# Patient Record
Sex: FEMALE | Race: WHITE | HISPANIC OR LATINO | URBAN - METROPOLITAN AREA
[De-identification: names, ages, dates, MRNs, and addresses within clinical notes are randomized per-mention and may not be internally consistent; named-entity substitution may affect disease eponyms.]

---

## 2020-03-12 ENCOUNTER — OFFICE VISIT (OUTPATIENT)
Dept: SURGERY | Age: 60
End: 2020-03-12

## 2020-03-12 DIAGNOSIS — Z12.11 ENCOUNTER FOR SCREENING COLONOSCOPY: Primary | ICD-10-CM

## 2020-03-12 PROCEDURE — 99213 OFFICE O/P EST LOW 20 MIN: CPT | Performed by: COLON & RECTAL SURGERY

## 2020-03-13 ENCOUNTER — TELEPHONE (OUTPATIENT)
Dept: SURGERY | Age: 60
End: 2020-03-13

## 2020-03-13 PROBLEM — Z12.11 ENCOUNTER FOR SCREENING COLONOSCOPY: Status: ACTIVE | Noted: 2020-03-13

## 2020-03-23 ENCOUNTER — TELEPHONE (OUTPATIENT)
Dept: SURGERY | Age: 60
End: 2020-03-23

## 2020-05-20 ENCOUNTER — TELEPHONE (OUTPATIENT)
Dept: SURGERY | Age: 60
End: 2020-05-20

## 2020-10-08 ENCOUNTER — LAB ENCOUNTER (OUTPATIENT)
Dept: LAB | Age: 60
End: 2020-10-08
Attending: INTERNAL MEDICINE
Payer: COMMERCIAL

## 2020-10-08 ENCOUNTER — OFFICE VISIT (OUTPATIENT)
Dept: INTERNAL MEDICINE CLINIC | Facility: CLINIC | Age: 60
End: 2020-10-08
Payer: COMMERCIAL

## 2020-10-08 VITALS
OXYGEN SATURATION: 98 % | DIASTOLIC BLOOD PRESSURE: 72 MMHG | SYSTOLIC BLOOD PRESSURE: 124 MMHG | HEART RATE: 87 BPM | WEIGHT: 209 LBS | TEMPERATURE: 97 F | BODY MASS INDEX: 35.68 KG/M2 | HEIGHT: 64.2 IN

## 2020-10-08 DIAGNOSIS — Z00.00 PHYSICAL EXAM: ICD-10-CM

## 2020-10-08 DIAGNOSIS — E11.9 TYPE 2 DIABETES MELLITUS WITHOUT COMPLICATION, WITHOUT LONG-TERM CURRENT USE OF INSULIN (HCC): ICD-10-CM

## 2020-10-08 DIAGNOSIS — I10 ESSENTIAL HYPERTENSION: ICD-10-CM

## 2020-10-08 DIAGNOSIS — E03.9 HYPOTHYROIDISM, UNSPECIFIED TYPE: ICD-10-CM

## 2020-10-08 DIAGNOSIS — E78.5 HYPERLIPIDEMIA, UNSPECIFIED HYPERLIPIDEMIA TYPE: ICD-10-CM

## 2020-10-08 DIAGNOSIS — Z00.00 PHYSICAL EXAM: Primary | ICD-10-CM

## 2020-10-08 PROCEDURE — 82043 UR ALBUMIN QUANTITATIVE: CPT

## 2020-10-08 PROCEDURE — 83036 HEMOGLOBIN GLYCOSYLATED A1C: CPT

## 2020-10-08 PROCEDURE — 80061 LIPID PANEL: CPT

## 2020-10-08 PROCEDURE — 85025 COMPLETE CBC W/AUTO DIFF WBC: CPT

## 2020-10-08 PROCEDURE — 84439 ASSAY OF FREE THYROXINE: CPT

## 2020-10-08 PROCEDURE — 99386 PREV VISIT NEW AGE 40-64: CPT | Performed by: INTERNAL MEDICINE

## 2020-10-08 PROCEDURE — 3078F DIAST BP <80 MM HG: CPT | Performed by: INTERNAL MEDICINE

## 2020-10-08 PROCEDURE — 36415 COLL VENOUS BLD VENIPUNCTURE: CPT

## 2020-10-08 PROCEDURE — 84443 ASSAY THYROID STIM HORMONE: CPT

## 2020-10-08 PROCEDURE — 3008F BODY MASS INDEX DOCD: CPT | Performed by: INTERNAL MEDICINE

## 2020-10-08 PROCEDURE — 82570 ASSAY OF URINE CREATININE: CPT

## 2020-10-08 PROCEDURE — 3074F SYST BP LT 130 MM HG: CPT | Performed by: INTERNAL MEDICINE

## 2020-10-08 PROCEDURE — 80053 COMPREHEN METABOLIC PANEL: CPT

## 2020-10-08 RX ORDER — HYDROCHLOROTHIAZIDE 25 MG/1
TABLET ORAL
COMMUNITY
Start: 2020-09-23 | End: 2021-01-14

## 2020-10-08 RX ORDER — ROSUVASTATIN CALCIUM 5 MG/1
TABLET, COATED ORAL
COMMUNITY
Start: 2018-01-20 | End: 2020-10-08

## 2020-10-08 RX ORDER — LOSARTAN POTASSIUM 50 MG/1
50 TABLET ORAL DAILY
Qty: 90 TABLET | Refills: 3 | Status: SHIPPED | OUTPATIENT
Start: 2020-10-08 | End: 2021-04-20

## 2020-10-08 RX ORDER — SITAGLIPTIN AND METFORMIN HYDROCHLORIDE 50; 1000 MG/1; MG/1
TABLET, FILM COATED ORAL
COMMUNITY
Start: 2020-09-28 | End: 2020-10-09

## 2020-10-08 RX ORDER — AMLODIPINE BESYLATE 5 MG/1
TABLET ORAL
COMMUNITY
Start: 2020-09-23 | End: 2020-10-08

## 2020-10-08 RX ORDER — DAPAGLIFLOZIN 10 MG/1
10 TABLET, FILM COATED ORAL EVERY MORNING
COMMUNITY
Start: 2020-07-23 | End: 2021-06-25 | Stop reason: ALTCHOICE

## 2020-10-08 RX ORDER — MULTIVIT WITH CALCIUM,IRON,MIN 27MG-0.4MG
TABLET ORAL
COMMUNITY

## 2020-10-08 RX ORDER — ROSUVASTATIN CALCIUM 5 MG/1
TABLET, COATED ORAL
Qty: 90 TABLET | Refills: 3 | Status: SHIPPED | OUTPATIENT
Start: 2020-10-08 | End: 2021-02-08

## 2020-10-08 RX ORDER — ERGOCALCIFEROL 1.25 MG/1
CAPSULE ORAL
COMMUNITY
Start: 2020-08-01 | End: 2021-05-04

## 2020-10-08 RX ORDER — PIOGLITAZONEHYDROCHLORIDE 15 MG/1
TABLET ORAL
COMMUNITY
Start: 2020-07-23 | End: 2020-11-02

## 2020-10-08 RX ORDER — LEVOTHYROXINE SODIUM 0.1 MG/1
TABLET ORAL
COMMUNITY
Start: 2020-08-20 | End: 2021-01-15

## 2020-10-08 RX ORDER — ETODOLAC 500 MG/1
TABLET, FILM COATED ORAL
COMMUNITY
Start: 2018-01-20 | End: 2021-02-08

## 2020-10-08 NOTE — PROGRESS NOTES
HPI:    Patient ID: Zeynep Curry is a 64year old female. Patient presents to the office for the first time to establish care. Patient is predominantly Ecuadorean-speaking.     Patient has history of diabetes mellitus type 2, hypertension, hypothyroid dizziness, light-headedness and headaches. Psychiatric/Behavioral: Negative for agitation and dysphoric mood. Current Outpatient Medications   Medication Sig Dispense Refill   • FARXIGA 10 MG Oral Tab Take 10 mg by mouth every morning.      • e exhibits no tenderness. Rest exam reveals no masses bilaterally   Abdominal: Soft. She exhibits no mass. There is no abdominal tenderness. Musculoskeletal:         General: No edema. Lymphadenopathy:     She has no cervical adenopathy.    Neurological in the year but this was canceled secondary to COVID-19 pandemic. She will try to reach out to gastroenterologist to reschedule again. Patient has already received seasonal flu vaccine. We will arrange follow-up after lab results reviewed.     Orders

## 2020-10-09 ENCOUNTER — TELEPHONE (OUTPATIENT)
Dept: INTERNAL MEDICINE CLINIC | Facility: CLINIC | Age: 60
End: 2020-10-09

## 2020-10-09 RX ORDER — SITAGLIPTIN AND METFORMIN HYDROCHLORIDE 50; 1000 MG/1; MG/1
1 TABLET, FILM COATED ORAL 2 TIMES DAILY WITH MEALS
Qty: 1 TABLET | Refills: 0 | COMMUNITY
Start: 2020-10-09 | End: 2021-01-15

## 2020-10-09 NOTE — TELEPHONE ENCOUNTER
Spoke to patient regarding message. Patient requests to call me back in a few minutes so her daughter can be on the phone with her while the rest of the message is relayed. Will await patient call back.

## 2020-10-09 NOTE — TELEPHONE ENCOUNTER
Know, if patient has not been on the 5 mg dose, then we should start 5 mg and will recheck lipid panel after she has been on 5 mg dose at next visit

## 2020-10-09 NOTE — TELEPHONE ENCOUNTER
Received consent from patient to discuss lab results with her daughter Gino Flynn over the phone. Spoke with both patient and her daughter in depth for 15 minutes.  Relayed message and instruction below to both patient and her daughter, they verbalized und

## 2020-10-09 NOTE — TELEPHONE ENCOUNTER
----- Message from Tameka Anguiano MD sent at 10/9/2020  7:25 AM CDT -----  Hemoglobin A1c is elevated at 8.2--therefore diabetes is not under good control. Urine for microalbumin is also elevated.     Patient was started on losartan yesterday--this will

## 2020-10-22 ENCOUNTER — TELEPHONE (OUTPATIENT)
Dept: INTERNAL MEDICINE CLINIC | Facility: CLINIC | Age: 60
End: 2020-10-22

## 2020-10-22 NOTE — TELEPHONE ENCOUNTER
Reviewed blood sugars as below. Spoke with patient and her daughter with daughter's assistance in translating. Sugars remain elevated both in the morning and the evening time.   Advised that she follow-up with Dr. Solange Swann on 11/2 for further management of

## 2020-10-22 NOTE — TELEPHONE ENCOUNTER
Called back Norma. She does not speak English very well so she brought the phone to her daughter who was able to translate. Daughter says she has been taking medication as doctor prescribed but sugars have been staying high.  Recently Dr Solange Swann told he

## 2020-10-22 NOTE — TELEPHONE ENCOUNTER
Patient is calling she would like to speak to a nurse   She is having a problem with her sugar  It has been running high    Please call patient [phone]

## 2020-10-28 ENCOUNTER — HOSPITAL ENCOUNTER (OUTPATIENT)
Age: 60
Discharge: HOME | End: 2020-10-28
Attending: EMERGENCY MEDICINE

## 2020-10-28 ENCOUNTER — TELEPHONE (OUTPATIENT)
Dept: INTERNAL MEDICINE CLINIC | Facility: CLINIC | Age: 60
End: 2020-10-28

## 2020-10-28 ENCOUNTER — APPOINTMENT (OUTPATIENT)
Dept: GENERAL RADIOLOGY | Age: 60
End: 2020-10-28
Attending: EMERGENCY MEDICINE

## 2020-10-28 VITALS
SYSTOLIC BLOOD PRESSURE: 127 MMHG | HEART RATE: 88 BPM | OXYGEN SATURATION: 97 % | DIASTOLIC BLOOD PRESSURE: 86 MMHG | TEMPERATURE: 98 F | RESPIRATION RATE: 18 BRPM

## 2020-10-28 DIAGNOSIS — M54.16 LUMBAR RADICULOPATHY: ICD-10-CM

## 2020-10-28 DIAGNOSIS — J02.0 ACUTE STREPTOCOCCAL PHARYNGITIS: Primary | ICD-10-CM

## 2020-10-28 PROCEDURE — 99214 OFFICE O/P EST MOD 30 MIN: CPT

## 2020-10-28 PROCEDURE — 72100 X-RAY EXAM L-S SPINE 2/3 VWS: CPT | Performed by: EMERGENCY MEDICINE

## 2020-10-28 PROCEDURE — 99204 OFFICE O/P NEW MOD 45 MIN: CPT

## 2020-10-28 PROCEDURE — 87430 STREP A AG IA: CPT

## 2020-10-28 RX ORDER — CYCLOBENZAPRINE HCL 10 MG
10 TABLET ORAL 3 TIMES DAILY PRN
Qty: 20 TABLET | Refills: 0 | Status: SHIPPED | OUTPATIENT
Start: 2020-10-28 | End: 2020-11-04

## 2020-10-28 RX ORDER — AMOXICILLIN AND CLAVULANATE POTASSIUM 875; 125 MG/1; MG/1
1 TABLET, FILM COATED ORAL 2 TIMES DAILY
Qty: 20 TABLET | Refills: 0 | Status: SHIPPED | OUTPATIENT
Start: 2020-10-28 | End: 2020-11-07

## 2020-10-28 RX ORDER — METHYLPREDNISOLONE 4 MG/1
TABLET ORAL
Qty: 1 PACKAGE | Refills: 0 | Status: SHIPPED | OUTPATIENT
Start: 2020-10-28 | End: 2021-02-08 | Stop reason: ALTCHOICE

## 2020-10-28 NOTE — TELEPHONE ENCOUNTER
Patient calling asking to speak to Dr. Chan Barrera. Has pain in right leg for about a week. \"Pain is bad. \"  Difficult to obtain information due to language barrier.      Best number to contact patient at [phone]

## 2020-10-28 NOTE — TELEPHONE ENCOUNTER
Ibeth and Norma called on [address] pt. Is having leg pain and tonsil pain she has white dots on her tonsils pt. Has an appt. tomorrow at 9:20 am is this ok?  Ph. # [phone]  Routed high to clinical

## 2020-10-28 NOTE — ED INITIAL ASSESSMENT (HPI)
PATIENT ARRIVED AMBULATORY TO ROOM C/O RIGHT LOWER BACK PAIN RADIATING TO THE RIGHT BUTTOCK/RIGHT LEG. PAIN STARTED 1 WEEK AGO. NO INJURY. NO NUMBNESS/TINGLING TO LEG. PATIENT ALSO C/O A RIGHT SIDED SORE THROAT THAT STARTED THIS MORNING. NO COUGH.  NO NASAL

## 2020-10-28 NOTE — TELEPHONE ENCOUNTER
We are unable to do any throat or respiratory cultures.   Therefore I would advise evaluation at [facility] urgent care site to rule out Covid or strep throat

## 2020-10-28 NOTE — TELEPHONE ENCOUNTER
To  - please cancel frederick for this patient for 10/29 with DR. MARTINEZ - please try calling them again to let them know because I do not know if  understood clearly thanks

## 2020-10-28 NOTE — ED PROVIDER NOTES
Patient Seen in: [facility]      History   Patient presents with:  Back Pain    Stated Complaint: pain in right leg, sore throat    HPI    Patient presents to the immediate care center with complaints of right low back pain radiating to the l right greater than left tonsillar hypertrophy with scattered exudates and erythema. There is no evidence of abscess or uvular deviation. There is anterior cervical lymph nodes which are tender worse on the right than the left. There is no fluctuance.   Jerri Correa with patient including need for follow up                     Disposition and Plan     Clinical Impression:  Acute streptococcal pharyngitis  (primary encounter diagnosis)  Lumbar radiculopathy    Disposition:  Discharge  10/28/2020  3:53 pm    Follow-up:

## 2020-10-28 NOTE — TELEPHONE ENCOUNTER
Called patient , spoke with  per hipaa and relayed DR. MARTINEZ message - gave address for [place]  F/U 10/29

## 2020-10-29 NOTE — TELEPHONE ENCOUNTER
Zack answered, Bob garzon--Spoke to patient and relayed MD message. Patient verbalized understanding.

## 2020-10-29 NOTE — TELEPHONE ENCOUNTER
THOMASI to Dr. Jammie Carrillo please advise---    Triage RN calling for condition update for UC f/u----  Pt was taken to [facility] [place] IC, strep test positive. Pt was started on Augmentin, Rx'ed Merol pack and cyclobenzaprine for right leg pain.     Pt's  stated

## 2020-11-02 ENCOUNTER — OFFICE VISIT (OUTPATIENT)
Dept: INTERNAL MEDICINE CLINIC | Facility: CLINIC | Age: 60
End: 2020-11-02
Payer: COMMERCIAL

## 2020-11-02 VITALS
BODY MASS INDEX: 35.17 KG/M2 | SYSTOLIC BLOOD PRESSURE: 126 MMHG | HEART RATE: 79 BPM | DIASTOLIC BLOOD PRESSURE: 80 MMHG | HEIGHT: 64 IN | WEIGHT: 206 LBS | TEMPERATURE: 98 F | OXYGEN SATURATION: 97 %

## 2020-11-02 DIAGNOSIS — E03.9 HYPOTHYROIDISM, UNSPECIFIED TYPE: ICD-10-CM

## 2020-11-02 DIAGNOSIS — J02.0 STREP PHARYNGITIS: Primary | ICD-10-CM

## 2020-11-02 DIAGNOSIS — I10 ESSENTIAL HYPERTENSION: ICD-10-CM

## 2020-11-02 DIAGNOSIS — E11.9 TYPE 2 DIABETES MELLITUS WITHOUT COMPLICATION, WITHOUT LONG-TERM CURRENT USE OF INSULIN (HCC): ICD-10-CM

## 2020-11-02 DIAGNOSIS — M54.50 ACUTE RIGHT-SIDED LOW BACK PAIN, UNSPECIFIED WHETHER SCIATICA PRESENT: ICD-10-CM

## 2020-11-02 PROCEDURE — 99214 OFFICE O/P EST MOD 30 MIN: CPT | Performed by: INTERNAL MEDICINE

## 2020-11-02 PROCEDURE — 3074F SYST BP LT 130 MM HG: CPT | Performed by: INTERNAL MEDICINE

## 2020-11-02 PROCEDURE — 3079F DIAST BP 80-89 MM HG: CPT | Performed by: INTERNAL MEDICINE

## 2020-11-02 PROCEDURE — 3008F BODY MASS INDEX DOCD: CPT | Performed by: INTERNAL MEDICINE

## 2020-11-02 PROCEDURE — 99212 OFFICE O/P EST SF 10 MIN: CPT | Performed by: INTERNAL MEDICINE

## 2020-11-02 RX ORDER — TRAMADOL HYDROCHLORIDE 50 MG/1
50 TABLET ORAL EVERY 8 HOURS PRN
Qty: 30 TABLET | Refills: 1 | Status: SHIPPED | OUTPATIENT
Start: 2020-11-02 | End: 2021-03-16

## 2020-11-02 RX ORDER — PIOGLITAZONEHYDROCHLORIDE 30 MG/1
30 TABLET ORAL DAILY
Qty: 90 TABLET | Refills: 3 | Status: SHIPPED | OUTPATIENT
Start: 2020-11-02 | End: 2021-02-09

## 2020-11-02 NOTE — PROGRESS NOTES
HPI:    Patient ID: Dylan Chapa is a 64year old female. Notes continued right sided throat pain. Went to urgent care 5 days ago--strep screen positive. Placed on Augmentin.   However not having difficulty swallowing and maintaining good appetite JANUMET  MG Oral Tab Take 1 tablet by mouth 2 (two) times daily with meals. 1 tablet 0   • FARXIGA 10 MG Oral Tab Take 10 mg by mouth every morning.      • ergocalciferol 1.25 MG (74080 UT) Oral Cap TOME 1 C PSULA POR V A ORAL ONE TIME PER WEEK     • adenopathy. Neurological: She is alert. No cranial nerve deficit. She exhibits normal muscle tone. Coordination normal.   Straight leg test is negative bilaterally   Psychiatric: She has a normal mood and affect.               ASSESSMENT/PLAN:   Strep pha mouth daily. • traMADol HCl 50 MG Oral Tab 30 tablet 1     Sig: Take 1 tablet (50 mg total) by mouth every 8 (eight) hours as needed for Pain.        Imaging & Referrals:  PHYSICAL THERAPY - INTERNAL  ENT - INTERNAL       YP#[id number]

## 2021-01-03 ENCOUNTER — HOSPITAL ENCOUNTER (OUTPATIENT)
Age: 61
Discharge: LEFT BEFORE DISCHARGE | End: 2021-01-03
Payer: MEDICAID

## 2021-01-03 ENCOUNTER — HOSPITAL ENCOUNTER (EMERGENCY)
Facility: HOSPITAL | Age: 61
Discharge: HOME | End: 2021-01-03
Attending: EMERGENCY MEDICINE
Payer: MEDICAID

## 2021-01-03 ENCOUNTER — APPOINTMENT (OUTPATIENT)
Dept: GENERAL RADIOLOGY | Facility: HOSPITAL | Age: 61
End: 2021-01-03
Attending: EMERGENCY MEDICINE
Payer: MEDICAID

## 2021-01-03 VITALS
BODY MASS INDEX: 32.33 KG/M2 | HEART RATE: 80 BPM | DIASTOLIC BLOOD PRESSURE: 74 MMHG | TEMPERATURE: 98 F | WEIGHT: 206 LBS | SYSTOLIC BLOOD PRESSURE: 118 MMHG | HEIGHT: 67 IN | RESPIRATION RATE: 18 BRPM | OXYGEN SATURATION: 97 %

## 2021-01-03 DIAGNOSIS — M77.8 ENTHESOPATHY OF RIGHT FOOT: Primary | ICD-10-CM

## 2021-01-03 PROCEDURE — 99283 EMERGENCY DEPT VISIT LOW MDM: CPT

## 2021-01-03 PROCEDURE — 73650 X-RAY EXAM OF HEEL: CPT | Performed by: EMERGENCY MEDICINE

## 2021-01-03 RX ORDER — HYDROCODONE BITARTRATE AND ACETAMINOPHEN 5; 325 MG/1; MG/1
1 TABLET ORAL EVERY 8 HOURS PRN
Qty: 10 TABLET | Refills: 0 | Status: SHIPPED | OUTPATIENT
Start: 2021-01-03 | End: 2021-02-08

## 2021-01-03 NOTE — ED NOTES
Actions and side effects of Norco reviewed. Importance of Podiatrist follow up reviewed. Family at bedside for help with translating discharge instructions. Pt currently refusing language line. ER tech at bedside for post op shoe and ace wrap.

## 2021-01-03 NOTE — ED PROVIDER NOTES
Patient Seen in: [facility] AND [facility] Emergency Department      History   Patient presents with:  Heel Pain    Stated Complaint: right heel pain     HPI/Subjective:   HPI    Patient presents to the emergency department complaining of right heel pain.   She s neck supple. Cardiovascular:      Rate and Rhythm: Normal rate and regular rhythm. Heart sounds: No murmur. Pulmonary:      Effort: Pulmonary effort is normal. No respiratory distress. Breath sounds: Normal breath sounds.    Musculoskeletal: R-0

## 2021-01-03 NOTE — ED NOTES
Pt to ED with c/o atraumatic right heel pain. Pt denies fever. No drainage noted to heel. Pt ambulating by self with steady gait. No deformity noted. Awaiting xray- will continue to monitor.

## 2021-01-14 RX ORDER — HYDROCHLOROTHIAZIDE 25 MG/1
25 TABLET ORAL DAILY
Qty: 90 TABLET | Refills: 3 | Status: SHIPPED | OUTPATIENT
Start: 2021-01-14

## 2021-01-14 NOTE — TELEPHONE ENCOUNTER
To Dr Hinojosa Files to please advise on hydrochlorothiazide refill request. Medication not prescribed by our office before

## 2021-01-15 NOTE — TELEPHONE ENCOUNTER
To Dr. Kim Pitt-- can you please advise on RXs as never prescribed by our office before? Per TE 10/9/20 Janumet was increased to BID but was not sent by us. Established care 10/8/2020. Thanks!

## 2021-01-16 RX ORDER — LEVOTHYROXINE SODIUM 0.1 MG/1
TABLET ORAL
Qty: 90 TABLET | Refills: 1 | Status: SHIPPED | OUTPATIENT
Start: 2021-01-16 | End: 2021-08-25

## 2021-01-16 RX ORDER — SITAGLIPTIN AND METFORMIN HYDROCHLORIDE 50; 1000 MG/1; MG/1
1 TABLET, FILM COATED ORAL 2 TIMES DAILY
Qty: 180 TABLET | Refills: 1 | Status: SHIPPED | OUTPATIENT
Start: 2021-01-16 | End: 2021-05-24

## 2021-02-08 ENCOUNTER — OFFICE VISIT (OUTPATIENT)
Dept: INTERNAL MEDICINE CLINIC | Facility: CLINIC | Age: 61
End: 2021-02-08
Payer: MEDICAID

## 2021-02-08 ENCOUNTER — LAB ENCOUNTER (OUTPATIENT)
Dept: LAB | Age: 61
End: 2021-02-08
Attending: INTERNAL MEDICINE
Payer: MEDICAID

## 2021-02-08 VITALS
OXYGEN SATURATION: 97 % | TEMPERATURE: 98 F | HEIGHT: 67 IN | DIASTOLIC BLOOD PRESSURE: 74 MMHG | BODY MASS INDEX: 32.77 KG/M2 | SYSTOLIC BLOOD PRESSURE: 108 MMHG | HEART RATE: 70 BPM | WEIGHT: 208.81 LBS

## 2021-02-08 DIAGNOSIS — M25.552 LEFT HIP PAIN: ICD-10-CM

## 2021-02-08 DIAGNOSIS — E11.9 TYPE 2 DIABETES MELLITUS WITHOUT COMPLICATION, WITHOUT LONG-TERM CURRENT USE OF INSULIN (HCC): ICD-10-CM

## 2021-02-08 DIAGNOSIS — I10 ESSENTIAL HYPERTENSION: ICD-10-CM

## 2021-02-08 DIAGNOSIS — E78.5 HYPERLIPIDEMIA, UNSPECIFIED HYPERLIPIDEMIA TYPE: ICD-10-CM

## 2021-02-08 DIAGNOSIS — E03.9 HYPOTHYROIDISM, UNSPECIFIED TYPE: ICD-10-CM

## 2021-02-08 DIAGNOSIS — Z12.9 CANCER SCREENING: ICD-10-CM

## 2021-02-08 DIAGNOSIS — M79.671 RIGHT FOOT PAIN: Primary | ICD-10-CM

## 2021-02-08 LAB
ALBUMIN SERPL-MCNC: 4.1 G/DL (ref 3.4–5)
ALBUMIN/GLOB SERPL: 1.1 {RATIO} (ref 1–2)
ALP LIVER SERPL-CCNC: 83 U/L
ALT SERPL-CCNC: 28 U/L
ANION GAP SERPL CALC-SCNC: 5 MMOL/L (ref 0–18)
AST SERPL-CCNC: 12 U/L (ref 15–37)
BASOPHILS # BLD AUTO: 0.04 X10(3) UL (ref 0–0.2)
BASOPHILS NFR BLD AUTO: 0.5 %
BILIRUB SERPL-MCNC: 0.4 MG/DL (ref 0.1–2)
BUN BLD-MCNC: 22 MG/DL (ref 7–18)
BUN/CREAT SERPL: 31.9 (ref 10–20)
CALCIUM BLD-MCNC: 9.9 MG/DL (ref 8.5–10.1)
CHLORIDE SERPL-SCNC: 104 MMOL/L (ref 98–112)
CO2 SERPL-SCNC: 30 MMOL/L (ref 21–32)
CREAT BLD-MCNC: 0.69 MG/DL
DEPRECATED RDW RBC AUTO: 47.1 FL (ref 35.1–46.3)
EOSINOPHIL # BLD AUTO: 0.15 X10(3) UL (ref 0–0.7)
EOSINOPHIL NFR BLD AUTO: 1.8 %
ERYTHROCYTE [DISTWIDTH] IN BLOOD BY AUTOMATED COUNT: 13.2 % (ref 11–15)
EST. AVERAGE GLUCOSE BLD GHB EST-MCNC: 180 MG/DL (ref 68–126)
GLOBULIN PLAS-MCNC: 3.6 G/DL (ref 2.8–4.4)
GLUCOSE BLD-MCNC: 174 MG/DL (ref 70–99)
HBA1C MFR BLD HPLC: 7.9 % (ref ?–5.7)
HCT VFR BLD AUTO: 43.9 %
HGB BLD-MCNC: 14 G/DL
IMM GRANULOCYTES # BLD AUTO: 0.02 X10(3) UL (ref 0–1)
IMM GRANULOCYTES NFR BLD: 0.2 %
LYMPHOCYTES # BLD AUTO: 2.17 X10(3) UL (ref 1–4)
LYMPHOCYTES NFR BLD AUTO: 26.4 %
M PROTEIN MFR SERPL ELPH: 7.7 G/DL (ref 6.4–8.2)
MCH RBC QN AUTO: 31 PG (ref 26–34)
MCHC RBC AUTO-ENTMCNC: 31.9 G/DL (ref 31–37)
MCV RBC AUTO: 97.1 FL
MONOCYTES # BLD AUTO: 0.59 X10(3) UL (ref 0.1–1)
MONOCYTES NFR BLD AUTO: 7.2 %
NEUTROPHILS # BLD AUTO: 5.25 X10 (3) UL (ref 1.5–7.7)
NEUTROPHILS # BLD AUTO: 5.25 X10(3) UL (ref 1.5–7.7)
NEUTROPHILS NFR BLD AUTO: 63.9 %
OSMOLALITY SERPL CALC.SUM OF ELEC: 296 MOSM/KG (ref 275–295)
PATIENT FASTING Y/N/NP: YES
PLATELET # BLD AUTO: 262 10(3)UL (ref 150–450)
POTASSIUM SERPL-SCNC: 4.6 MMOL/L (ref 3.5–5.1)
RBC # BLD AUTO: 4.52 X10(6)UL
SODIUM SERPL-SCNC: 139 MMOL/L (ref 136–145)
WBC # BLD AUTO: 8.2 X10(3) UL (ref 4–11)

## 2021-02-08 PROCEDURE — 99214 OFFICE O/P EST MOD 30 MIN: CPT | Performed by: INTERNAL MEDICINE

## 2021-02-08 PROCEDURE — 36415 COLL VENOUS BLD VENIPUNCTURE: CPT

## 2021-02-08 PROCEDURE — 90732 PPSV23 VACC 2 YRS+ SUBQ/IM: CPT | Performed by: INTERNAL MEDICINE

## 2021-02-08 PROCEDURE — 80053 COMPREHEN METABOLIC PANEL: CPT

## 2021-02-08 PROCEDURE — 3078F DIAST BP <80 MM HG: CPT | Performed by: INTERNAL MEDICINE

## 2021-02-08 PROCEDURE — 3074F SYST BP LT 130 MM HG: CPT | Performed by: INTERNAL MEDICINE

## 2021-02-08 PROCEDURE — 90471 IMMUNIZATION ADMIN: CPT | Performed by: INTERNAL MEDICINE

## 2021-02-08 PROCEDURE — 3008F BODY MASS INDEX DOCD: CPT | Performed by: INTERNAL MEDICINE

## 2021-02-08 PROCEDURE — 83036 HEMOGLOBIN GLYCOSYLATED A1C: CPT

## 2021-02-08 PROCEDURE — 85025 COMPLETE CBC W/AUTO DIFF WBC: CPT

## 2021-02-08 NOTE — PROGRESS NOTES
HPI:    Patient ID: Mart Andersen is a 64year old female. Presents for f/u. Patient is noting right foot pain near heel. She denies any trauma.   However she states that she has had plantar fasciitis in the past and had improvement with steroid sh mouth 2 (two) times daily. 180 tablet 1   • hydrochlorothiazide 25 MG Oral Tab Take 1 tablet (25 mg total) by mouth daily. 90 tablet 3   • Pioglitazone HCl 30 MG Oral Tab Take 1 tablet (30 mg total) by mouth daily.  90 tablet 3   • traMADol HCl 50 MG Oral T hypertension  Hypothyroidism, unspecified type  Hyperlipidemia, unspecified hyperlipidemia type  Type 2 diabetes mellitus without complication, without long-term current use of insulin (hcc)  Cancer screening    Patient's right foot pain is most likely rel

## 2021-02-09 ENCOUNTER — TELEPHONE (OUTPATIENT)
Dept: INTERNAL MEDICINE CLINIC | Facility: CLINIC | Age: 61
End: 2021-02-09

## 2021-02-09 ENCOUNTER — ORDER TRANSCRIPTION (OUTPATIENT)
Dept: PHYSICAL THERAPY | Facility: HOSPITAL | Age: 61
End: 2021-02-09

## 2021-02-09 DIAGNOSIS — M79.671 RIGHT FOOT PAIN: Primary | ICD-10-CM

## 2021-02-09 DIAGNOSIS — M25.552 LEFT HIP PAIN: ICD-10-CM

## 2021-02-09 RX ORDER — PIOGLITAZONEHYDROCHLORIDE 45 MG/1
45 TABLET ORAL DAILY
Qty: 90 TABLET | Refills: 3 | Status: SHIPPED | OUTPATIENT
Start: 2021-02-09 | End: 2021-08-12

## 2021-02-09 NOTE — TELEPHONE ENCOUNTER
----- Message from Delanna Holter, MD sent at 2/9/2021  9:00 AM CST -----  Hemoglobin A1c shows only small improvement at 7.9--therefore diabetes is still not under optimal control. Continue same Kaila and Hannah.   However we will increase pioglitazon

## 2021-02-09 NOTE — TELEPHONE ENCOUNTER
Spoke with patient and her , Lalo Coleman (OK per HIPAA), and relayed MD message and instructions, they verbalize understanding and agree with plan. They deny any questions at this time.

## 2021-02-19 ENCOUNTER — OFFICE VISIT (OUTPATIENT)
Dept: PHYSICAL THERAPY | Age: 61
End: 2021-02-19
Attending: INTERNAL MEDICINE
Payer: MEDICAID

## 2021-02-19 DIAGNOSIS — M25.552 LEFT HIP PAIN: ICD-10-CM

## 2021-02-19 DIAGNOSIS — M79.671 RIGHT FOOT PAIN: ICD-10-CM

## 2021-02-19 PROCEDURE — 97162 PT EVAL MOD COMPLEX 30 MIN: CPT

## 2021-02-19 NOTE — PROGRESS NOTES
LOWER EXTREMITY EVALUATION:   Referring Physician: Dr. Sonia Rico  Diagnosis: R Foot pain     Date of Service: 2/19/2021     PATIENT SUMMARY   Tracey Espitia is a 64year old female who presents to therapy today with complaints of R foot pain- starting in D understanding and performs HEP correctly without reported pain. Skilled Physical Therapy is medically necessary to address the above impairments and reach functional goals.      Precautions:  None  OBJECTIVE:   Observation: flat feet  Palpation: fat pad sev descending stairs without compensation  · Pt will have increased ankle strength to 5/5 throughout for improved ankle control with ADLs such as prolonged gait and stair negotiation (  · Pt will have improved SLS to >20s for increased ankle stability with am

## 2021-02-22 ENCOUNTER — OFFICE VISIT (OUTPATIENT)
Dept: PHYSICAL THERAPY | Age: 61
End: 2021-02-22
Attending: INTERNAL MEDICINE
Payer: MEDICAID

## 2021-02-22 DIAGNOSIS — M25.552 LEFT HIP PAIN: ICD-10-CM

## 2021-02-22 DIAGNOSIS — M79.671 RIGHT FOOT PAIN: ICD-10-CM

## 2021-02-22 PROCEDURE — 97110 THERAPEUTIC EXERCISES: CPT

## 2021-02-22 PROCEDURE — 97140 MANUAL THERAPY 1/> REGIONS: CPT

## 2021-02-22 NOTE — PROGRESS NOTES
Dx: R heel pain         Insurance (Authorized # of Visits):  9           Authorizing Physician: Dr. Kristin Soares  Next MD visit: none scheduled  Fall Risk: standard         Precautions: n/a             Subjective: Ordered her insert is coming today.  Otherwise her Treatment: 45 min  Total Treatment Time: 45 min

## 2021-02-24 ENCOUNTER — OFFICE VISIT (OUTPATIENT)
Dept: PHYSICAL THERAPY | Age: 61
End: 2021-02-24
Attending: INTERNAL MEDICINE
Payer: MEDICAID

## 2021-02-24 PROCEDURE — 97110 THERAPEUTIC EXERCISES: CPT

## 2021-02-24 PROCEDURE — 97140 MANUAL THERAPY 1/> REGIONS: CPT

## 2021-02-24 NOTE — PROGRESS NOTES
Dx: R heel pain         Insurance (Authorized # of Visits):  9           Authorizing Physician: Dr. Rachele Owen  Next MD visit: none scheduled  Fall Risk: standard         Precautions: n/a             Subjective: More or less the same.  Trying the stretches - but range of motion  -decreased pain with gait Manual therapy  Compression to STJ grade III+, P1  -no change    L5/S1 R UPA grade III+  -to P1 in lumbar spine locally  -better with gait        Therex:  Long sitting calf stretch x30, no change  Standing WBing g

## 2021-03-01 ENCOUNTER — APPOINTMENT (OUTPATIENT)
Dept: PHYSICAL THERAPY | Age: 61
End: 2021-03-01
Attending: INTERNAL MEDICINE
Payer: MEDICAID

## 2021-03-02 ENCOUNTER — TELEPHONE (OUTPATIENT)
Dept: INTERNAL MEDICINE CLINIC | Facility: CLINIC | Age: 61
End: 2021-03-02

## 2021-03-02 NOTE — TELEPHONE ENCOUNTER
[facility] requesting AIDA Robertson 10 mg Bureaux A Partager    [place] [phone], [phone]  Placed in purple folder

## 2021-03-02 NOTE — H&P
[address] [facility] [address] Gastroenterology                                                                                                  Clinic History and Physical     Pa Father    • Other (cad) Mother       Social History: Social History    Tobacco Use      Smoking status: Never Smoker      Smokeless tobacco: Never Used    Vaping Use      Vaping Use: Never used    Alcohol use: Never    Drug use: Never       Medications (Ac ALLERGIC/IMMUNOLOGIC:  negative for hay fever   ENDOCRINE:  negative for cold intolerance and heat intolerance  MUSCULOSKELETAL:  negative for joint effusion/severe erythema  BEHAVIOR/PSYCH:  negative for psychotic behavior      PHYSICAL EXAM:   Blood pr r/t history of hypertension/diabetes  -Prep: Split dose Suprep or Colyte/TriLyte or equivalent due to medical history  -Anti-platelets and anti-coagulants: None  -Diabetes meds: Janumet, pioglitazone, Farxiga - hold day before/day of procedure    ** If MAC

## 2021-03-02 NOTE — TELEPHONE ENCOUNTER
[address] [facility] sending fax that capsules are not covered, please change to tablets per Ins  Placed in purple folder

## 2021-03-03 ENCOUNTER — OFFICE VISIT (OUTPATIENT)
Dept: PHYSICAL THERAPY | Age: 61
End: 2021-03-03
Attending: INTERNAL MEDICINE
Payer: MEDICAID

## 2021-03-03 PROCEDURE — 97140 MANUAL THERAPY 1/> REGIONS: CPT

## 2021-03-03 PROCEDURE — 97110 THERAPEUTIC EXERCISES: CPT

## 2021-03-03 NOTE — PROGRESS NOTES
Dx: R heel pain         Insurance (Authorized # of Visits):  9           Authorizing Physician: Dr. Chan Barrera  Next MD visit: none scheduled  Fall Risk: standard         Precautions: n/a             Subjective:  Therapy has helped her foot pain- it is getting b strength, balance/proprioception, gait  Date: 2/22/2021  TX#: 2/7 Date: 2/24/21             TX#: 3/7 Date: 3/3/21                TX#: 4/7 Date:                 TX#: 5/ Date:    Tx#: 6/   Manual therapy:  Medial glide of STJ to P1 grade III+  -no change   Ta

## 2021-03-04 RX ORDER — ROSUVASTATIN CALCIUM 10 MG/1
10 TABLET, COATED ORAL NIGHTLY
Qty: 90 TABLET | Refills: 3 | Status: SHIPPED | OUTPATIENT
Start: 2021-03-04

## 2021-03-04 NOTE — TELEPHONE ENCOUNTER
Changed to tablets  Requested Prescriptions     Signed Prescriptions Disp Refills   • Rosuvastatin Calcium 10 MG Oral Tab 90 tablet 3     Sig: Take 1 tablet (10 mg total) by mouth nightly.      Authorizing Provider: Jerri Marshall     Ordering User: Darcy Catalan

## 2021-03-05 ENCOUNTER — OFFICE VISIT (OUTPATIENT)
Dept: PHYSICAL THERAPY | Age: 61
End: 2021-03-05
Attending: INTERNAL MEDICINE
Payer: MEDICAID

## 2021-03-05 PROCEDURE — 97110 THERAPEUTIC EXERCISES: CPT

## 2021-03-05 PROCEDURE — 97140 MANUAL THERAPY 1/> REGIONS: CPT

## 2021-03-05 RX ORDER — ROSUVASTATIN CALCIUM 10 MG/1
10 TABLET, COATED ORAL NIGHTLY
Qty: 90 TABLET | Refills: 3 | OUTPATIENT
Start: 2021-03-05

## 2021-03-08 ENCOUNTER — OFFICE VISIT (OUTPATIENT)
Dept: PHYSICAL THERAPY | Age: 61
End: 2021-03-08
Attending: INTERNAL MEDICINE
Payer: MEDICAID

## 2021-03-08 PROCEDURE — 97110 THERAPEUTIC EXERCISES: CPT

## 2021-03-08 PROCEDURE — 97140 MANUAL THERAPY 1/> REGIONS: CPT

## 2021-03-08 RX ORDER — SITAGLIPTIN AND METFORMIN HYDROCHLORIDE 1000; 50 MG/1; MG/1
1 TABLET, FILM COATED ORAL 2 TIMES DAILY
Qty: 180 TABLET | Refills: 1 | OUTPATIENT
Start: 2021-03-08

## 2021-03-08 RX ORDER — LEVOTHYROXINE SODIUM 0.1 MG/1
100 TABLET ORAL EVERY MORNING
Qty: 90 TABLET | Refills: 1 | OUTPATIENT
Start: 2021-03-08

## 2021-03-08 RX ORDER — HYDROCHLOROTHIAZIDE 25 MG/1
25 TABLET ORAL DAILY
Qty: 90 TABLET | Refills: 3 | OUTPATIENT
Start: 2021-03-08

## 2021-03-08 NOTE — PROGRESS NOTES
Dx: R heel pain         Insurance (Authorized # of Visits):  9           Authorizing Physician: Dr. Queen Rj Mckeon MD visit: none scheduled  Fall Risk: standard         Precautions: n/a             Subjective: This week has been more painful in the hip.  Has d 4/7 Date: 3/5/21                TX#: 5/7 Date:    Tx#: 6/7   Manual therapy:  Medial glide of STJ to P1 grade III+  -no change   Talocrural jt mob grade III+ progressing DF range of motion  -decreased pain with gait Manual therapy  Compression to STJ grade due to traffic)

## 2021-03-10 ENCOUNTER — TELEPHONE (OUTPATIENT)
Dept: PHYSICAL THERAPY | Age: 61
End: 2021-03-10

## 2021-03-10 ENCOUNTER — APPOINTMENT (OUTPATIENT)
Dept: PHYSICAL THERAPY | Age: 61
End: 2021-03-10
Attending: INTERNAL MEDICINE
Payer: MEDICAID

## 2021-03-15 ENCOUNTER — APPOINTMENT (OUTPATIENT)
Dept: PHYSICAL THERAPY | Age: 61
End: 2021-03-15
Attending: INTERNAL MEDICINE
Payer: MEDICAID

## 2021-03-16 ENCOUNTER — OFFICE VISIT (OUTPATIENT)
Dept: GASTROENTEROLOGY | Facility: CLINIC | Age: 61
End: 2021-03-16
Payer: MEDICAID

## 2021-03-16 ENCOUNTER — TELEPHONE (OUTPATIENT)
Dept: GASTROENTEROLOGY | Facility: CLINIC | Age: 61
End: 2021-03-16

## 2021-03-16 VITALS
BODY MASS INDEX: 33.33 KG/M2 | HEART RATE: 65 BPM | SYSTOLIC BLOOD PRESSURE: 107 MMHG | HEIGHT: 67 IN | DIASTOLIC BLOOD PRESSURE: 63 MMHG | WEIGHT: 212.38 LBS

## 2021-03-16 DIAGNOSIS — Z12.11 SCREENING FOR COLON CANCER: Primary | ICD-10-CM

## 2021-03-16 DIAGNOSIS — Z12.11 COLON CANCER SCREENING: Primary | ICD-10-CM

## 2021-03-16 PROCEDURE — 3074F SYST BP LT 130 MM HG: CPT | Performed by: NURSE PRACTITIONER

## 2021-03-16 PROCEDURE — 3078F DIAST BP <80 MM HG: CPT | Performed by: NURSE PRACTITIONER

## 2021-03-16 PROCEDURE — 99243 OFF/OP CNSLTJ NEW/EST LOW 30: CPT | Performed by: NURSE PRACTITIONER

## 2021-03-16 PROCEDURE — 3008F BODY MASS INDEX DOCD: CPT | Performed by: NURSE PRACTITIONER

## 2021-03-16 RX ORDER — EMPAGLIFLOZIN 25 MG/1
25 TABLET, FILM COATED ORAL DAILY
COMMUNITY
Start: 2021-03-09 | End: 2021-05-04

## 2021-03-16 RX ORDER — SODIUM, POTASSIUM,MAG SULFATES 17.5-3.13G
SOLUTION, RECONSTITUTED, ORAL ORAL
Qty: 1 BOTTLE | Refills: 0 | Status: ON HOLD | OUTPATIENT
Start: 2021-03-16 | End: 2021-06-21

## 2021-03-16 RX ORDER — AMLODIPINE BESYLATE 5 MG/1
5 TABLET ORAL DAILY
COMMUNITY
Start: 2021-03-09 | End: 2021-05-04

## 2021-03-16 RX ORDER — SITAGLIPTIN 50 MG/1
50 TABLET, FILM COATED ORAL DAILY
COMMUNITY
Start: 2021-03-09 | End: 2021-05-04

## 2021-03-16 NOTE — PATIENT INSTRUCTIONS
-Schedule colonoscopy w/ Dr. Ada Lockett with MAC or Dr. Emilie Hills with IV ethanilight or MAC  Dx: screening  -Eligible for NE: No r/t history of hypertension/diabetes  -Prep: Split dose Suprep or Colyte/TriLyte or equivalent due to medical history  -Anti-platel

## 2021-03-16 NOTE — TELEPHONE ENCOUNTER
Scheduled for: Colonoscopy [id number]  Provider Name: Dr Katt Howard   Date:  Mon 6/21/2021   Location: [facility]   Sedation: IV    Time: 10:15 am   Prep: split dose Suprep or Colyte   Meds/Allergies Reconciled?: NKDA    Diagnosis with codes: Screening Z12.11    Was

## 2021-03-23 ENCOUNTER — TELEPHONE (OUTPATIENT)
Dept: GASTROENTEROLOGY | Facility: CLINIC | Age: 61
End: 2021-03-23

## 2021-03-23 NOTE — TELEPHONE ENCOUNTER
Current Outpatient Medications   Medication Sig Dispense Refill   • Na Sulfate-K Sulfate-Mg Sulf (SUPREP BOWEL PREP KIT) 17.5-3.13-1.6 GM/177ML Oral Solution Take prep as directed by gastro office.  May substitute with Trilyte/generic equivalent if needed 1

## 2021-03-24 NOTE — TELEPHONE ENCOUNTER
Spoke with patient's preferred pharmacy on file, [id number], who now has several bowel preps in stock. I sent in order for generic Golytely (ok to replace with trilyte or generic per rx order).

## 2021-04-20 RX ORDER — LOSARTAN POTASSIUM 50 MG/1
50 TABLET ORAL DAILY
Qty: 90 TABLET | Refills: 1 | Status: SHIPPED | OUTPATIENT
Start: 2021-04-20

## 2021-04-20 NOTE — TELEPHONE ENCOUNTER
Last Rx sent by Dr. Lisa Ponce for a year supply on 10/8/2020--to [facility]. Per  in previous conversation re: his Rx request, pharmacy has changed to [facility]. Remainder of Rx sent to new pharmacy.

## 2021-04-28 ENCOUNTER — TELEPHONE (OUTPATIENT)
Dept: INTERNAL MEDICINE CLINIC | Facility: CLINIC | Age: 61
End: 2021-04-28

## 2021-04-28 NOTE — TELEPHONE ENCOUNTER
Fax rec'd from [employer], [facility] is not covered by pt insurance    Alternatives listed  Fax placed in purple folder  Tasked to [employer]

## 2021-04-28 NOTE — TELEPHONE ENCOUNTER
[address] requesting Prior Authorization for:  Janumet  Please call [phone], pt ID#:  [id number]  Fax placed in purple folder  Tasked to RX

## 2021-04-29 NOTE — TELEPHONE ENCOUNTER
Noted--please make sure that this is a 40-minute appointment since it will take extra time secondary to language issues

## 2021-04-29 NOTE — TELEPHONE ENCOUNTER
Upon further review, pt seeing multiple other MD's and getting multiple diabetic agents prescribed and filled at pharmacies per [place]  and care everywhere. Please see below.      Per care everywhere, pt recently saw [facility] PCP Massimo Marin MD

## 2021-04-29 NOTE — TELEPHONE ENCOUNTER
To Dr. Lisa lamar -  Spoke with pt's daughter re: Bertrand Kahlil' message below. Pt was told by insurance to see you. Strongly advised daughter/pt to see only one PCP who will prescribe - understanding verbalized.   Pt has appt with you 5/4/21 and will bring a

## 2021-04-29 NOTE — TELEPHONE ENCOUNTER
Pt's daughter Livia Pantoja returned nurses call    Please call her back today - after 3pm   As she will be in a meeting     [phone]

## 2021-05-04 ENCOUNTER — OFFICE VISIT (OUTPATIENT)
Dept: INTERNAL MEDICINE CLINIC | Facility: CLINIC | Age: 61
End: 2021-05-04
Payer: MEDICAID

## 2021-05-04 ENCOUNTER — LAB ENCOUNTER (OUTPATIENT)
Dept: LAB | Age: 61
End: 2021-05-04
Attending: INTERNAL MEDICINE
Payer: MEDICAID

## 2021-05-04 VITALS
SYSTOLIC BLOOD PRESSURE: 112 MMHG | WEIGHT: 217 LBS | RESPIRATION RATE: 14 BRPM | HEART RATE: 71 BPM | BODY MASS INDEX: 34 KG/M2 | OXYGEN SATURATION: 98 % | DIASTOLIC BLOOD PRESSURE: 60 MMHG | TEMPERATURE: 98 F

## 2021-05-04 DIAGNOSIS — Z12.9 CANCER SCREENING: ICD-10-CM

## 2021-05-04 DIAGNOSIS — I10 ESSENTIAL HYPERTENSION: Primary | ICD-10-CM

## 2021-05-04 DIAGNOSIS — E53.8 B12 DEFICIENCY: ICD-10-CM

## 2021-05-04 DIAGNOSIS — I10 ESSENTIAL HYPERTENSION: ICD-10-CM

## 2021-05-04 DIAGNOSIS — E11.9 TYPE 2 DIABETES MELLITUS WITHOUT COMPLICATION, WITHOUT LONG-TERM CURRENT USE OF INSULIN (HCC): ICD-10-CM

## 2021-05-04 DIAGNOSIS — E03.9 HYPOTHYROIDISM, UNSPECIFIED TYPE: ICD-10-CM

## 2021-05-04 DIAGNOSIS — E78.5 HYPERLIPIDEMIA, UNSPECIFIED HYPERLIPIDEMIA TYPE: ICD-10-CM

## 2021-05-04 DIAGNOSIS — K21.9 GASTROESOPHAGEAL REFLUX DISEASE, UNSPECIFIED WHETHER ESOPHAGITIS PRESENT: ICD-10-CM

## 2021-05-04 DIAGNOSIS — M79.671 RIGHT FOOT PAIN: ICD-10-CM

## 2021-05-04 PROCEDURE — 36415 COLL VENOUS BLD VENIPUNCTURE: CPT

## 2021-05-04 PROCEDURE — 84439 ASSAY OF FREE THYROXINE: CPT

## 2021-05-04 PROCEDURE — 83036 HEMOGLOBIN GLYCOSYLATED A1C: CPT

## 2021-05-04 PROCEDURE — 99214 OFFICE O/P EST MOD 30 MIN: CPT | Performed by: INTERNAL MEDICINE

## 2021-05-04 PROCEDURE — 80061 LIPID PANEL: CPT

## 2021-05-04 PROCEDURE — 85025 COMPLETE CBC W/AUTO DIFF WBC: CPT

## 2021-05-04 PROCEDURE — 82607 VITAMIN B-12: CPT

## 2021-05-04 PROCEDURE — 3078F DIAST BP <80 MM HG: CPT | Performed by: INTERNAL MEDICINE

## 2021-05-04 PROCEDURE — 3074F SYST BP LT 130 MM HG: CPT | Performed by: INTERNAL MEDICINE

## 2021-05-04 PROCEDURE — 84443 ASSAY THYROID STIM HORMONE: CPT

## 2021-05-04 PROCEDURE — 80053 COMPREHEN METABOLIC PANEL: CPT

## 2021-05-04 RX ORDER — OMEPRAZOLE 40 MG/1
40 CAPSULE, DELAYED RELEASE ORAL DAILY
Qty: 30 CAPSULE | Refills: 6 | Status: SHIPPED | OUTPATIENT
Start: 2021-05-04 | End: 2021-09-15 | Stop reason: ALTCHOICE

## 2021-05-04 NOTE — PROGRESS NOTES
HPI:    Patient ID: Aide Harris is a 64year old female. Presents for f/u. Patient states that she has only been taking the medications that we have prescribed.   However we have gotten requested from pharmacy for other medications from previous d Gastrointestinal: Negative for abdominal pain, blood in stool, nausea and vomiting. Genitourinary: Negative for dysuria. Musculoskeletal: Positive for neck pain. Negative for back pain and neck stiffness.         Right foot pain   Neurological: Magy Charmaineous Pharynx: No oropharyngeal exudate. Neck:      Vascular: No carotid bruit. Comments: There is no thyromegaly noted  Cardiovascular:      Rate and Rhythm: Normal rate and regular rhythm. Heart sounds: Normal heart sounds.    Pulmonary:      Effort diabetes. Continue same Janumet, pioglitazone, Farxiga pending A1c result. Patient is also due for diabetic eye exam--referral given and patient agrees to make appointment.     Patient is noting increased weight gain--this may be secondary to her decrease Sig: Take 1 capsule (40 mg total) by mouth daily.        Imaging & Referrals:  PODIATRY - INTERNAL  OPHTHALMOLOGY - EXTERNAL  FRANCIS SCREENING BILAT (CPT=77067)       IG#[id number]

## 2021-05-05 ENCOUNTER — TELEPHONE (OUTPATIENT)
Dept: INTERNAL MEDICINE CLINIC | Facility: CLINIC | Age: 61
End: 2021-05-05

## 2021-05-05 DIAGNOSIS — E11.9 TYPE 2 DIABETES MELLITUS WITHOUT COMPLICATION, WITHOUT LONG-TERM CURRENT USE OF INSULIN (HCC): Primary | ICD-10-CM

## 2021-05-05 NOTE — TELEPHONE ENCOUNTER
----- Message from Mandy Baez MD sent at 5/5/2021  9:32 AM CDT -----  Cholesterol is much better at 123 with LDL of 47--patient therefore is now at goal LDL of less than 100.     General chemistries including liver function test are normal.    Thyroid

## 2021-05-06 NOTE — TELEPHONE ENCOUNTER
Spoke to pt and pt's daughter Radhika Trinidad (ok per verbal release) and relayed MD message. Pt and Jyotsna verbalized understanding and agree with plan. Endocrine referral along with ophthalmology referral from 5/4 mailed to pt's home as requested.

## 2021-05-17 ENCOUNTER — HOSPITAL ENCOUNTER (OUTPATIENT)
Dept: MAMMOGRAPHY | Facility: HOSPITAL | Age: 61
Discharge: HOME | End: 2021-05-17
Attending: INTERNAL MEDICINE
Payer: MEDICAID

## 2021-05-17 DIAGNOSIS — Z12.9 CANCER SCREENING: ICD-10-CM

## 2021-05-17 PROCEDURE — 77063 BREAST TOMOSYNTHESIS BI: CPT | Performed by: INTERNAL MEDICINE

## 2021-05-17 PROCEDURE — 77067 SCR MAMMO BI INCL CAD: CPT | Performed by: INTERNAL MEDICINE

## 2021-05-21 ENCOUNTER — TELEPHONE (OUTPATIENT)
Dept: INTERNAL MEDICINE CLINIC | Facility: CLINIC | Age: 61
End: 2021-05-21

## 2021-05-21 NOTE — TELEPHONE ENCOUNTER
[employer] requesting Prior Authorization for:  Kaitlyn Phillips does not cover this medication.   Please call [phone], pt JS#[id number]    addt'l sheet shows covered alternatives    Faxes placed in purple folder    Tasked to RX

## 2021-05-24 NOTE — TELEPHONE ENCOUNTER
Per [employer] formulary, medications covered separately (not as a combo product)  Rxs change to individual Rxs and sent

## 2021-06-18 ENCOUNTER — LAB ENCOUNTER (OUTPATIENT)
Dept: LAB | Facility: HOSPITAL | Age: 61
End: 2021-06-18
Attending: INTERNAL MEDICINE
Payer: MEDICAID

## 2021-06-18 DIAGNOSIS — Z01.818 PRE-OP TESTING: ICD-10-CM

## 2021-06-21 ENCOUNTER — HOSPITAL ENCOUNTER (OUTPATIENT)
Facility: HOSPITAL | Age: 61
Setting detail: HOSPITAL OUTPATIENT SURGERY
Discharge: HOME | End: 2021-06-21
Attending: INTERNAL MEDICINE | Admitting: INTERNAL MEDICINE
Payer: MEDICAID

## 2021-06-21 VITALS
HEIGHT: 65 IN | DIASTOLIC BLOOD PRESSURE: 76 MMHG | WEIGHT: 215 LBS | BODY MASS INDEX: 35.82 KG/M2 | OXYGEN SATURATION: 94 % | RESPIRATION RATE: 19 BRPM | TEMPERATURE: 99 F | SYSTOLIC BLOOD PRESSURE: 112 MMHG | HEART RATE: 58 BPM

## 2021-06-21 DIAGNOSIS — Z01.818 PRE-OP TESTING: Primary | ICD-10-CM

## 2021-06-21 DIAGNOSIS — Z12.11 COLON CANCER SCREENING: ICD-10-CM

## 2021-06-21 PROCEDURE — 0DBL8ZX EXCISION OF TRANSVERSE COLON, VIA NATURAL OR ARTIFICIAL OPENING ENDOSCOPIC, DIAGNOSTIC: ICD-10-PCS | Performed by: INTERNAL MEDICINE

## 2021-06-21 PROCEDURE — G0500 MOD SEDAT ENDO SERVICE >5YRS: HCPCS | Performed by: INTERNAL MEDICINE

## 2021-06-21 PROCEDURE — 45385 COLONOSCOPY W/LESION REMOVAL: CPT | Performed by: INTERNAL MEDICINE

## 2021-06-21 RX ORDER — SODIUM CHLORIDE, SODIUM LACTATE, POTASSIUM CHLORIDE, CALCIUM CHLORIDE 600; 310; 30; 20 MG/100ML; MG/100ML; MG/100ML; MG/100ML
INJECTION, SOLUTION INTRAVENOUS CONTINUOUS
Status: DISCONTINUED | OUTPATIENT
Start: 2021-06-21 | End: 2021-06-21

## 2021-06-21 RX ORDER — MIDAZOLAM HYDROCHLORIDE 1 MG/ML
INJECTION INTRAMUSCULAR; INTRAVENOUS
Status: DISCONTINUED | OUTPATIENT
Start: 2021-06-21 | End: 2021-06-21

## 2021-06-21 RX ORDER — MIDAZOLAM HYDROCHLORIDE 1 MG/ML
1 INJECTION INTRAMUSCULAR; INTRAVENOUS EVERY 5 MIN PRN
Status: DISCONTINUED | OUTPATIENT
Start: 2021-06-21 | End: 2021-06-21

## 2021-06-21 RX ORDER — SODIUM CHLORIDE 0.9 % (FLUSH) 0.9 %
10 SYRINGE (ML) INJECTION AS NEEDED
Status: DISCONTINUED | OUTPATIENT
Start: 2021-06-21 | End: 2021-06-21

## 2021-06-21 NOTE — H&P
History & Physical Examination    Patient Name: Yola Queen  MRN: [id number]  [facility]: [id number]  YOB: 1960    Diagnosis: Colorectal cancer screening      metFORMIN HCl 1000 MG Oral Tab, Take 1 tablet (1,000 mg total) by mouth 2 (two) times injection 25 mcg, 25 mcg, Intravenous, Q5 Min PRN        Allergies: No Known Allergies    Past Medical History:   Diagnosis Date   • Benign essential HTN    • Diabetes ([address]    • High blood pressure    • Hypothyroidism      Past Surgical History:   Procedu

## 2021-06-21 NOTE — OPERATIVE REPORT
[facility] Endoscopy Report      Date of Procedure:  06/21/21      Preoperative Diagnosis:  Colorectal cancer screening      Postoperative Diagnosis:  Colon polyp      Procedure:    Colonoscopy with polypectomy      Surgeon:  Maciel Jacobson ileum    Recommendations: Follow-up biopsy results. Surveillance/screening colonoscopy in 7-10 years depending on whether the polyp is adenomatous or hyperplastic respectively.         Lucila Neil MD  6/21/2021

## 2021-06-23 ENCOUNTER — TELEPHONE (OUTPATIENT)
Dept: GASTROENTEROLOGY | Facility: CLINIC | Age: 61
End: 2021-06-23

## 2021-06-23 NOTE — TELEPHONE ENCOUNTER
----- Message from Jaime Zelaya MD sent at 6/22/2021  5:56 PM CDT -----  GI RNs: Please inform the patient (Luxembour[place]-speaking) that the polyp removed was a lipoma (fatty polyp, not precancerous and completely benign).   I would recommend a screening

## 2021-06-23 NOTE — TELEPHONE ENCOUNTER
Spoke with patient using language line. She verbalizes understanding of result note. Colonoscopy added to recall and health maintenance.

## 2021-06-25 ENCOUNTER — OFFICE VISIT (OUTPATIENT)
Dept: PODIATRY CLINIC | Facility: CLINIC | Age: 61
End: 2021-06-25
Payer: MEDICAID

## 2021-06-25 VITALS — WEIGHT: 215 LBS | HEIGHT: 65 IN | BODY MASS INDEX: 35.82 KG/M2

## 2021-06-25 DIAGNOSIS — M76.61 ACHILLES TENDINITIS OF RIGHT LOWER EXTREMITY: Primary | ICD-10-CM

## 2021-06-25 PROCEDURE — L3060 FOOT ARCH SUPP LONGITUD/META: HCPCS | Performed by: PODIATRIST

## 2021-06-25 PROCEDURE — 99243 OFF/OP CNSLTJ NEW/EST LOW 30: CPT | Performed by: PODIATRIST

## 2021-06-25 PROCEDURE — 3008F BODY MASS INDEX DOCD: CPT | Performed by: PODIATRIST

## 2021-06-25 RX ORDER — MELOXICAM 15 MG/1
15 TABLET ORAL DAILY
Qty: 30 TABLET | Refills: 1 | Status: SHIPPED | OUTPATIENT
Start: 2021-06-25 | End: 2021-07-30

## 2021-06-25 RX ORDER — LANCETS 30 GAUGE
EACH MISCELLANEOUS
COMMUNITY
Start: 2021-03-09

## 2021-06-25 NOTE — PROGRESS NOTES
HPI:    Patient ID: Chaparrita Mcmahan is a 63year old female. This pleasant 68-year-old female presents as new patient to me on consult from [place] [id number]. Patient is accompanied by her  who assisted in translation.   Patient's chief complaint is right Allergies:No Known Allergies   PHYSICAL EXAM:     On physical exam pulses are normal.  There is no evidence of edema nor erythema. There is a visible and palpable prominence in the retrocalcaneal aspect of this heel.   The Achilles tendon is intact and

## 2021-06-30 ENCOUNTER — OFFICE VISIT (OUTPATIENT)
Dept: ENDOCRINOLOGY CLINIC | Facility: CLINIC | Age: 61
End: 2021-06-30
Payer: MEDICAID

## 2021-06-30 ENCOUNTER — TELEPHONE (OUTPATIENT)
Dept: ENDOCRINOLOGY CLINIC | Facility: CLINIC | Age: 61
End: 2021-06-30

## 2021-06-30 VITALS
DIASTOLIC BLOOD PRESSURE: 81 MMHG | BODY MASS INDEX: 36 KG/M2 | HEART RATE: 70 BPM | SYSTOLIC BLOOD PRESSURE: 119 MMHG | WEIGHT: 219 LBS

## 2021-06-30 DIAGNOSIS — E78.5 DYSLIPIDEMIA: ICD-10-CM

## 2021-06-30 DIAGNOSIS — E11.65 TYPE 2 DIABETES MELLITUS WITH HYPERGLYCEMIA, UNSPECIFIED WHETHER LONG TERM INSULIN USE (HCC): Primary | ICD-10-CM

## 2021-06-30 PROCEDURE — 82947 ASSAY GLUCOSE BLOOD QUANT: CPT | Performed by: INTERNAL MEDICINE

## 2021-06-30 PROCEDURE — 99204 OFFICE O/P NEW MOD 45 MIN: CPT | Performed by: INTERNAL MEDICINE

## 2021-06-30 PROCEDURE — 3079F DIAST BP 80-89 MM HG: CPT | Performed by: INTERNAL MEDICINE

## 2021-06-30 PROCEDURE — 36416 COLLJ CAPILLARY BLOOD SPEC: CPT | Performed by: INTERNAL MEDICINE

## 2021-06-30 PROCEDURE — 3074F SYST BP LT 130 MM HG: CPT | Performed by: INTERNAL MEDICINE

## 2021-06-30 RX ORDER — LIRAGLUTIDE 6 MG/ML
INJECTION SUBCUTANEOUS
Qty: 27 ML | Refills: 0 | Status: SHIPPED | OUTPATIENT
Start: 2021-06-30 | End: 2021-07-22

## 2021-06-30 NOTE — H&P
New Patient Visit - Diabetes    CONSULT - Reason for Visit:  Diabetes management.     Requesting Physician: Dr. Elizabet Velasquez:  Patient presents with:  Diabetes: Pt is present to speal to the doctor regarding her diabetes        HISTORY OF AMEENA mouth daily. 90 tablet 3   • Multiple Vitamins-Minerals (WOMENS ONE DAILY) Oral Tab Take by mouth.          PAST MEDICAL HISTORY:   Past Medical History:   Diagnosis Date   • Benign essential HTN    • Diabetes ([address]    • High blood pressure    • Hypothyroid 119/81   Pulse: 70   Weight: 219 lb (99.3 kg)     BMI: Body mass index is 36.44 kg/m². General Appearance:  alert, well developed, in no acute distress  Head: Atraumatic  Eyes:  normal conjunctivae, sclera. , normal sclera and normal pupils  Throat/ changes reviewed  g). Hypoglycemia recognition and management discussed    2. Patient’s BP is normal today  3.  Dyslipidemia  A) Discussed lifestyle modifications including reductions in dietary total and saturated fat, weight loss, aerobic exercise, and ea

## 2021-06-30 NOTE — TELEPHONE ENCOUNTER
There is no generic for victoza. RN called Natalio Samuel back and she said it says on the script it's for generic victoza. RN informed her to please disregard that note. System automatically generates it.  She knew there was no generic but just wanted to make adams

## 2021-06-30 NOTE — TELEPHONE ENCOUNTER
Elvi/ Pharmacist states medication is unavailable and requesting a script for the brand name Victoza.         Current Outpatient Medications:   •  Liraglutide (VICTOZA) 18 MG/3ML Subcutaneous Solution Pen-injector, Inject 0.6 mg into the skin daily for 7

## 2021-07-08 ENCOUNTER — TELEPHONE (OUTPATIENT)
Dept: ENDOCRINOLOGY CLINIC | Facility: CLINIC | Age: 61
End: 2021-07-08

## 2021-07-09 ENCOUNTER — OFFICE VISIT (OUTPATIENT)
Dept: PODIATRY CLINIC | Facility: CLINIC | Age: 61
End: 2021-07-09
Payer: MEDICAID

## 2021-07-09 VITALS — BODY MASS INDEX: 36.49 KG/M2 | WEIGHT: 219 LBS | HEIGHT: 65 IN

## 2021-07-09 DIAGNOSIS — M76.61 ACHILLES TENDINITIS OF RIGHT LOWER EXTREMITY: Primary | ICD-10-CM

## 2021-07-09 PROCEDURE — 3008F BODY MASS INDEX DOCD: CPT | Performed by: PODIATRIST

## 2021-07-09 PROCEDURE — 99213 OFFICE O/P EST LOW 20 MIN: CPT | Performed by: PODIATRIST

## 2021-07-09 NOTE — PROGRESS NOTES
HPI:    Patient ID: Qiana Lipscomb is a 63year old female. This 60-year-old female presents for follow-up in reference to right heel pain. Patient states that she is at least 50% improved. There is a dramatic and significant change noted.   She is acco I also gave her  a piece of adhesive felt to add an additional eighth of an inch lift. I encouraged shoes with support at all times no barefoot walking. She is having no ill effects taking the oral anti-inflammatory.   We will continue that medicat

## 2021-07-20 ENCOUNTER — PATIENT MESSAGE (OUTPATIENT)
Dept: ENDOCRINOLOGY CLINIC | Facility: CLINIC | Age: 61
End: 2021-07-20

## 2021-07-21 NOTE — TELEPHONE ENCOUNTER
From: Qiana Lipscomb  To: Steve Jain MD  Sent: 7/20/2021 6:36 PM CDT  Subject: Prescription Question    Hello, I have been taking the Victoza prescription for 16 days now, but I've had a lot of side effects.  I've had a lot of nausea, loss of appetit

## 2021-07-22 RX ORDER — EXENATIDE 250 UG/ML
5 INJECTION SUBCUTANEOUS
Qty: 1.2 ML | Refills: 0 | Status: SHIPPED | OUTPATIENT
Start: 2021-07-22 | End: 2021-08-12

## 2021-07-22 NOTE — TELEPHONE ENCOUNTER
I will defer to Dr. Gonzalez Obregon since I'm not sure whatever meds they were discussing at visit.

## 2021-07-22 NOTE — TELEPHONE ENCOUNTER
If I remember correctly, we had spoken about trying byetta for 2 weeks to see how she does ( based on insurance coverage)   If that does not work, next step would be ozmepic  Please confirm with patient if this is okay  Can prescribe byetta for a one month

## 2021-07-30 ENCOUNTER — OFFICE VISIT (OUTPATIENT)
Dept: PODIATRY CLINIC | Facility: CLINIC | Age: 61
End: 2021-07-30
Payer: MEDICAID

## 2021-07-30 DIAGNOSIS — M76.61 ACHILLES TENDINITIS OF RIGHT LOWER EXTREMITY: Primary | ICD-10-CM

## 2021-07-30 PROCEDURE — 99213 OFFICE O/P EST LOW 20 MIN: CPT | Performed by: PODIATRIST

## 2021-07-30 RX ORDER — MELOXICAM 15 MG/1
15 TABLET ORAL DAILY
Qty: 30 TABLET | Refills: 0 | Status: SHIPPED | OUTPATIENT
Start: 2021-07-30 | End: 2021-09-15 | Stop reason: ALTCHOICE

## 2021-07-30 NOTE — PROGRESS NOTES
HPI:    Patient ID: Yola Queen is a 63year old female. 81-year-old female presents for follow-up in reference to right Achilles pain. Patient continues to steadily improve.   In fact she is very happy she still has some discomfort on a daily basis edema nor erythema. I am pleased with the noted progress and after further discussion I modified the insole and added an additional eighth of an inch. She was instructed to maintain the use of meloxicam 15 mg each evening with dinner for 3 more weeks.   Courtney Hwang

## 2021-08-12 ENCOUNTER — TELEPHONE (OUTPATIENT)
Dept: ENDOCRINOLOGY CLINIC | Facility: CLINIC | Age: 61
End: 2021-08-12

## 2021-08-12 RX ORDER — PIOGLITAZONEHYDROCHLORIDE 45 MG/1
45 TABLET ORAL DAILY
Qty: 90 TABLET | Refills: 0 | Status: SHIPPED | OUTPATIENT
Start: 2021-08-12

## 2021-08-12 RX ORDER — EXENATIDE 250 UG/ML
10 INJECTION SUBCUTANEOUS
Qty: 2.4 ML | Refills: 0 | Status: SHIPPED | OUTPATIENT
Start: 2021-08-12 | End: 2021-08-17

## 2021-08-12 NOTE — TELEPHONE ENCOUNTER
Pt called in with  and states that she has been having high blood sugar levels lately. Attempting to call RN.  Please call

## 2021-08-12 NOTE — TELEPHONE ENCOUNTER
rn called patient , states blood sugars are still high , taking byetta 5mcg bid and said is not helping sugars.   8/12 218  8/11 230  8/10 237  8/9 217  8/8 224  8/7 238  Blood sugars am fasting  Regimen: metformin 1000 mg bid  januvia 100 mg daily  States

## 2021-08-12 NOTE — TELEPHONE ENCOUNTER
rn called patient with message below by dr Kiara [place], patient verbalized understanding via readback  Agreed with plan  Pioglitazone sent to pharmacy since out of med  byetta new dose also sent  Pt has frederick 10/1/21

## 2021-08-12 NOTE — TELEPHONE ENCOUNTER
Let us PA for ozmepic 0.25 mg q week x two weeks, followed by 0.5 mg q week  She has t/f victoza  and byetta ( due to SE and inadequate control)    Till PA is done:   Resume actois  STOP [facility] [place]  C/w MTF  Byetta : increase to 10 mcg BID    Once ozmepic is

## 2021-08-12 NOTE — TELEPHONE ENCOUNTER
Medication PA Requested:                                                          CoverMyMeds Used:  Key:  Sig: ozmepic 0.25 mg q week x two weeks, followed by 0.5 mg q week  DX Code:      E11.65                               CPT code (if applicable):   Ca

## 2021-08-13 NOTE — TELEPHONE ENCOUNTER
Medication PA Requested:Ozempic (0.25 or 0.5 MG/DOSE) 2MG/1.5ML pen-injectors                                                          CoverMyMeds Used:  Key:  [id number]  Sig: ozmepic 0.25 mg q week x two weeks, followed by 0.5 mg q week  DX Code:      E11.

## 2021-08-17 RX ORDER — SEMAGLUTIDE 1.34 MG/ML
INJECTION, SOLUTION SUBCUTANEOUS
Qty: 4.5 ML | Refills: 0 | Status: SHIPPED | OUTPATIENT
Start: 2021-08-17 | End: 2021-11-29

## 2021-08-17 NOTE — TELEPHONE ENCOUNTER
Dr. Martin PARRA approved for Ozempic. Pended script. Should patient finish with Byetta or immediately switch to Ozempic?

## 2021-08-17 NOTE — TELEPHONE ENCOUNTER
No information in cover my meds, called [facility] [phone] spoke with Aime Hinson. She states Ozempic was approved. Auth # [id number], from 5- to 8/13/2022. I did not call pharmacy-do not see prescription.      Medication PA Requested:Oze

## 2021-08-19 ENCOUNTER — TELEPHONE (OUTPATIENT)
Dept: ENDOCRINOLOGY CLINIC | Facility: CLINIC | Age: 61
End: 2021-08-19

## 2021-08-19 NOTE — TELEPHONE ENCOUNTER
pts daughter is calling to find out why [facility] has not received Rx from our office for Ozempic med?

## 2021-08-19 NOTE — TELEPHONE ENCOUNTER
rn called [facility] and states they just received rx , had to release it in system. rn called patient and informed Tatyana  , rx is at pharmacy and will get ready for her.

## 2021-08-25 RX ORDER — LEVOTHYROXINE SODIUM 0.1 MG/1
TABLET ORAL
Qty: 90 TABLET | Refills: 3 | Status: SHIPPED | OUTPATIENT
Start: 2021-08-25

## 2021-09-07 ENCOUNTER — TELEPHONE (OUTPATIENT)
Dept: INTERNAL MEDICINE CLINIC | Facility: CLINIC | Age: 61
End: 2021-09-07

## 2021-09-07 NOTE — TELEPHONE ENCOUNTER
Mady faxed a PA request for Janumet not covered by ins. alternatives listed see Rx   Fax.  # [phone]  Placed in [address] [place] [address] folder Routed to Rx

## 2021-09-10 ENCOUNTER — OFFICE VISIT (OUTPATIENT)
Dept: PODIATRY CLINIC | Facility: CLINIC | Age: 61
End: 2021-09-10
Payer: MEDICAID

## 2021-09-10 DIAGNOSIS — M76.61 ACHILLES TENDINITIS OF RIGHT LOWER EXTREMITY: Primary | ICD-10-CM

## 2021-09-10 PROCEDURE — 99213 OFFICE O/P EST LOW 20 MIN: CPT | Performed by: PODIATRIST

## 2021-09-10 NOTE — PROGRESS NOTES
HPI:    Patient ID: Juan A Medina is a 63year old female. He 7year-old female presents for follow-up care in reference to retrocalcaneal right heel pain.   Patient states that the pain has completely gone away and is not currently hurting her for the l region of the retrocalcaneal spurring or within the confines of the Achilles tendon. There is normal strength. Is no evidence of neurologic deficit I am pleased with the noted progress.   Today I reduced the heel lift and instructed her to use the insole

## 2021-09-15 ENCOUNTER — OFFICE VISIT (OUTPATIENT)
Dept: INTERNAL MEDICINE CLINIC | Facility: CLINIC | Age: 61
End: 2021-09-15
Payer: MEDICAID

## 2021-09-15 VITALS
DIASTOLIC BLOOD PRESSURE: 84 MMHG | WEIGHT: 220 LBS | HEIGHT: 65 IN | SYSTOLIC BLOOD PRESSURE: 122 MMHG | HEART RATE: 80 BPM | BODY MASS INDEX: 36.65 KG/M2 | OXYGEN SATURATION: 98 % | TEMPERATURE: 98 F

## 2021-09-15 DIAGNOSIS — Z00.00 PHYSICAL EXAM: Primary | ICD-10-CM

## 2021-09-15 DIAGNOSIS — M79.671 RIGHT FOOT PAIN: ICD-10-CM

## 2021-09-15 DIAGNOSIS — E03.9 HYPOTHYROIDISM, UNSPECIFIED TYPE: ICD-10-CM

## 2021-09-15 DIAGNOSIS — K21.9 GASTROESOPHAGEAL REFLUX DISEASE, UNSPECIFIED WHETHER ESOPHAGITIS PRESENT: ICD-10-CM

## 2021-09-15 DIAGNOSIS — I10 ESSENTIAL HYPERTENSION: ICD-10-CM

## 2021-09-15 DIAGNOSIS — E11.9 TYPE 2 DIABETES MELLITUS WITHOUT COMPLICATION, WITHOUT LONG-TERM CURRENT USE OF INSULIN (HCC): ICD-10-CM

## 2021-09-15 DIAGNOSIS — R07.0 THROAT PAIN: ICD-10-CM

## 2021-09-15 DIAGNOSIS — E78.5 HYPERLIPIDEMIA, UNSPECIFIED HYPERLIPIDEMIA TYPE: ICD-10-CM

## 2021-09-15 LAB
CARTRIDGE LOT#: ABNORMAL NUMERIC
HEMOGLOBIN A1C: 8.8 % (ref 4.3–5.6)

## 2021-09-15 PROCEDURE — 83036 HEMOGLOBIN GLYCOSYLATED A1C: CPT | Performed by: INTERNAL MEDICINE

## 2021-09-15 PROCEDURE — 3074F SYST BP LT 130 MM HG: CPT | Performed by: INTERNAL MEDICINE

## 2021-09-15 PROCEDURE — 99396 PREV VISIT EST AGE 40-64: CPT | Performed by: INTERNAL MEDICINE

## 2021-09-15 PROCEDURE — 3052F HG A1C>EQUAL 8.0%<EQUAL 9.0%: CPT | Performed by: INTERNAL MEDICINE

## 2021-09-15 PROCEDURE — 3008F BODY MASS INDEX DOCD: CPT | Performed by: INTERNAL MEDICINE

## 2021-09-15 PROCEDURE — 3079F DIAST BP 80-89 MM HG: CPT | Performed by: INTERNAL MEDICINE

## 2021-09-15 RX ORDER — EMPAGLIFLOZIN 25 MG/1
25 TABLET, FILM COATED ORAL DAILY
Qty: 30 TABLET | Refills: 6 | Status: SHIPPED | OUTPATIENT
Start: 2021-09-15

## 2021-09-15 RX ORDER — SITAGLIPTIN 100 MG/1
50 TABLET, FILM COATED ORAL
COMMUNITY
Start: 2021-09-07 | End: 2021-09-15

## 2021-09-15 RX ORDER — EXENATIDE 250 UG/ML
10 INJECTION SUBCUTANEOUS
COMMUNITY
Start: 2021-08-19 | End: 2021-09-15

## 2021-09-15 NOTE — PROGRESS NOTES
HPI:    Patient ID: Zak Can is a 63year old female. Presents for physical exam    Notes a fullness in her throat and describes it as a scratchy feeling when she eats. More noticeable with solids than with liquids.   Appetite remains good and th pain.   Neurological: Negative for dizziness, syncope, light-headedness and headaches. Psychiatric/Behavioral: Negative for agitation. The patient is not nervous/anxious.              Current Outpatient Medications   Medication Sig Dispense Refill   • Emp without any exudates noted. Eyes:      General: No scleral icterus. Extraocular Movements: Extraocular movements intact. Pupils: Pupils are equal, round, and reactive to light. Neck:      Vascular: No carotid bruit.    Cardiovascular:      Rate to monitor Accu-Cheks. She has appointment with endocrinologist next month and will discuss further with her. Blood pressure is controlled with use of hydrochlorothiazide.     Continue levothyroxine for management of hypothyroidism--last thyroid functio

## 2021-09-24 ENCOUNTER — OFFICE VISIT (OUTPATIENT)
Dept: OTOLARYNGOLOGY | Facility: CLINIC | Age: 61
End: 2021-09-24
Payer: MEDICAID

## 2021-09-24 VITALS — HEIGHT: 65 IN | BODY MASS INDEX: 36.65 KG/M2 | WEIGHT: 220 LBS

## 2021-09-24 DIAGNOSIS — K11.20 SIALOADENITIS OF SUBMANDIBULAR GLAND: Primary | ICD-10-CM

## 2021-09-24 PROCEDURE — 99243 OFF/OP CNSLTJ NEW/EST LOW 30: CPT | Performed by: OTOLARYNGOLOGY

## 2021-09-24 PROCEDURE — 3008F BODY MASS INDEX DOCD: CPT | Performed by: OTOLARYNGOLOGY

## 2021-09-24 RX ORDER — AMOXICILLIN AND CLAVULANATE POTASSIUM 875; 125 MG/1; MG/1
1 TABLET, FILM COATED ORAL EVERY 12 HOURS
Qty: 20 TABLET | Refills: 0 | Status: SHIPPED | OUTPATIENT
Start: 2021-09-24 | End: 2021-10-15 | Stop reason: ALTCHOICE

## 2021-09-24 RX ORDER — METHYLPREDNISOLONE 4 MG/1
TABLET ORAL
Qty: 21 TABLET | Refills: 0 | Status: SHIPPED | OUTPATIENT
Start: 2021-09-24 | End: 2021-10-15 | Stop reason: ALTCHOICE

## 2021-09-24 NOTE — PROGRESS NOTES
Juan A Medina is a 63year old female. Patient presents with:  Throat Problem: pt is here today for throat pain. it is mainly on her right side when she is swallowing and eating food.        HISTORY OF PRESENT ILLNESS  She presents with 3 episodes of cristian Negative Frequent skin infections, pigment change and rash. Hema/Lymph Negative Easy bleeding and easy bruising.            PHYSICAL EXAM    Ht 5' 5\" (1.651 m)   Wt 220 lb (99.8 kg)   BMI 36.61 kg/m²        Constitutional Normal Overall appearance - Norm 3  •  Pioglitazone HCl (ACTOS) 45 MG Oral Tab, Take 1 tablet (45 mg total) by mouth daily. , Disp: 90 tablet, Rfl: 0  •  Insulin Pen Needle 32G X 4 MM Does not apply Misc, Use with victoza once a day, Disp: 100 each, Rfl: 0  •  metFORMIN HCl 1000 MG Oral Ta identified these errors have been corrected. While every attempt is made to correct errors during dictation discrepancies may still exist    Giovany Castro MD    9/24/2021    3:21 PM

## 2021-10-15 ENCOUNTER — OFFICE VISIT (OUTPATIENT)
Dept: OTOLARYNGOLOGY | Facility: CLINIC | Age: 61
End: 2021-10-15
Payer: MEDICAID

## 2021-10-15 VITALS — HEIGHT: 65 IN | WEIGHT: 220 LBS | BODY MASS INDEX: 36.65 KG/M2

## 2021-10-15 DIAGNOSIS — K11.20 SIALOADENITIS OF SUBMANDIBULAR GLAND: Primary | ICD-10-CM

## 2021-10-15 PROCEDURE — 3008F BODY MASS INDEX DOCD: CPT | Performed by: OTOLARYNGOLOGY

## 2021-10-15 PROCEDURE — 99213 OFFICE O/P EST LOW 20 MIN: CPT | Performed by: OTOLARYNGOLOGY

## 2021-10-15 NOTE — PROGRESS NOTES
Aide Harris is a 63year old female. Patient presents with: Follow - Up: pt presents today for 3 week f/u on sialoadenitits of submandibular gland .  pt states feeling the same sxs , and medications has not worked, but also finished augmentin      HIS Constitutional Negative Fatigue, fever and weight loss. ENMT Negative Drooling. Eyes Negative Blurred vision and vision changes. Respiratory Negative Dyspnea and wheezing. Cardio Negative Chest pain, irregular heartbeat/palpitations and syncope. Take 25 mg by mouth daily. , Disp: 30 tablet, Rfl: 6  •  LEVOTHYROXINE 100 MCG Oral Tab, TAKE 1 TABLET BY MOUTH EVERY MORNING, Disp: 90 tablet, Rfl: 3  •  Pioglitazone HCl (ACTOS) 45 MG Oral Tab, Take 1 tablet (45 mg total) by mouth daily. , Disp: 90 tablet, software. As a result errors may occur. When identified these errors have been corrected. While every attempt is made to correct errors during dictation discrepancies may still exist    Giovany Roland MD    10/15/2021    5:44 PM

## 2021-10-25 ENCOUNTER — TELEPHONE (OUTPATIENT)
Dept: INTERNAL MEDICINE CLINIC | Facility: CLINIC | Age: 61
End: 2021-10-25

## 2021-10-26 NOTE — TELEPHONE ENCOUNTER
[place] , losartan 50mg was just dispensed on 10/24 for 90 tablets. Spoke to patient who reports that she is not having any problems with getting this medication. Carrie PARRA.

## 2021-10-28 ENCOUNTER — IMMUNIZATION (OUTPATIENT)
Dept: INTERNAL MEDICINE CLINIC | Facility: CLINIC | Age: 61
End: 2021-10-28
Payer: MEDICAID

## 2021-10-28 DIAGNOSIS — Z23 NEED FOR VACCINATION: Primary | ICD-10-CM

## 2021-10-28 PROCEDURE — 90471 IMMUNIZATION ADMIN: CPT | Performed by: INTERNAL MEDICINE

## 2021-10-28 PROCEDURE — 90686 IIV4 VACC NO PRSV 0.5 ML IM: CPT | Performed by: INTERNAL MEDICINE

## 2022-01-12 ENCOUNTER — OFFICE VISIT (OUTPATIENT)
Dept: INTERNAL MEDICINE CLINIC | Facility: CLINIC | Age: 62
End: 2022-01-12
Payer: MEDICAID

## 2022-01-12 VITALS
DIASTOLIC BLOOD PRESSURE: 70 MMHG | WEIGHT: 213.38 LBS | HEART RATE: 88 BPM | SYSTOLIC BLOOD PRESSURE: 104 MMHG | HEIGHT: 65 IN | BODY MASS INDEX: 35.55 KG/M2

## 2022-01-12 DIAGNOSIS — M54.50 LOW BACK PAIN, UNSPECIFIED BACK PAIN LATERALITY, UNSPECIFIED CHRONICITY, UNSPECIFIED WHETHER SCIATICA PRESENT: ICD-10-CM

## 2022-01-12 DIAGNOSIS — K21.9 GASTROESOPHAGEAL REFLUX DISEASE, UNSPECIFIED WHETHER ESOPHAGITIS PRESENT: ICD-10-CM

## 2022-01-12 DIAGNOSIS — R05.9 COUGH: ICD-10-CM

## 2022-01-12 DIAGNOSIS — K11.20 SIALOADENITIS OF SUBMANDIBULAR GLAND: ICD-10-CM

## 2022-01-12 DIAGNOSIS — I10 ESSENTIAL HYPERTENSION: ICD-10-CM

## 2022-01-12 DIAGNOSIS — E11.9 TYPE 2 DIABETES MELLITUS WITHOUT COMPLICATION, WITHOUT LONG-TERM CURRENT USE OF INSULIN (HCC): Primary | ICD-10-CM

## 2022-01-12 LAB
CARTRIDGE LOT#: ABNORMAL NUMERIC
HEMOGLOBIN A1C: 8.7 % (ref 4.3–5.6)

## 2022-01-12 PROCEDURE — 3074F SYST BP LT 130 MM HG: CPT | Performed by: INTERNAL MEDICINE

## 2022-01-12 PROCEDURE — 83036 HEMOGLOBIN GLYCOSYLATED A1C: CPT | Performed by: INTERNAL MEDICINE

## 2022-01-12 PROCEDURE — 3078F DIAST BP <80 MM HG: CPT | Performed by: INTERNAL MEDICINE

## 2022-01-12 PROCEDURE — 99213 OFFICE O/P EST LOW 20 MIN: CPT | Performed by: INTERNAL MEDICINE

## 2022-01-12 PROCEDURE — 3008F BODY MASS INDEX DOCD: CPT | Performed by: INTERNAL MEDICINE

## 2022-01-12 PROCEDURE — 3052F HG A1C>EQUAL 8.0%<EQUAL 9.0%: CPT | Performed by: INTERNAL MEDICINE

## 2022-01-12 RX ORDER — MELOXICAM 15 MG/1
15 TABLET ORAL DAILY
Qty: 30 TABLET | Refills: 0 | Status: SHIPPED | OUTPATIENT
Start: 2022-01-12

## 2022-01-12 RX ORDER — NATEGLINIDE 120 MG/1
120 TABLET, COATED ORAL
Qty: 270 TABLET | Refills: 3 | Status: SHIPPED | OUTPATIENT
Start: 2022-01-12

## 2022-01-12 NOTE — PROGRESS NOTES
HPI:    Patient ID: Marino Guzmán is a 63year old female. Notes cough--dry, denies fever. Only occurs intermittently and not associated with shortness of breath, but worse when out in the cold. Right submadibular swelling decresaed.   Patient did (25 mg total) by mouth daily. 90 tablet 3   • Multiple Vitamins-Minerals (WOMENS ONE DAILY) Oral Tab Take by mouth.        Allergies:No Known Allergies   PHYSICAL EXAM:   /70   Pulse 88   Ht 5' 5\" (1.651 m)   Wt 213 lb 6 oz (96.8 kg)   BMI 35.51 kg/m heating pad to area. We will try short course of nonsteroidal anti-inflammatory with meloxicam 15 mg daily for 2 weeks. Patient will notify me if no significant improvement. Reflux symptoms are controlled with use of PPI.     Blood pressure remains und

## 2022-02-09 ENCOUNTER — TELEPHONE (OUTPATIENT)
Dept: INTERNAL MEDICINE CLINIC | Facility: CLINIC | Age: 62
End: 2022-02-09

## 2022-02-09 ENCOUNTER — PATIENT MESSAGE (OUTPATIENT)
Dept: INTERNAL MEDICINE CLINIC | Facility: CLINIC | Age: 62
End: 2022-02-09

## 2022-02-09 RX ORDER — PIOGLITAZONEHYDROCHLORIDE 45 MG/1
45 TABLET ORAL DAILY
Qty: 90 TABLET | Refills: 0 | Status: SHIPPED | OUTPATIENT
Start: 2022-02-09

## 2022-02-10 RX ORDER — BLOOD SUGAR DIAGNOSTIC
STRIP MISCELLANEOUS
Qty: 200 EACH | Refills: 3 | Status: SHIPPED | OUTPATIENT
Start: 2022-02-10

## 2022-02-10 NOTE — TELEPHONE ENCOUNTER
From: Mayra Huizar  To: Mirta Silverman MD  Sent: 2/9/2022 7:03 PM CST  Subject: Test strip refill     [facility] doctor, I wanted to ask you if you can send a test strip referral to my pharmacy.

## 2022-02-11 RX ORDER — ROSUVASTATIN CALCIUM 10 MG/1
10 TABLET, COATED ORAL NIGHTLY
Qty: 90 TABLET | Refills: 3 | Status: SHIPPED | OUTPATIENT
Start: 2022-02-11

## 2022-02-11 RX ORDER — EMPAGLIFLOZIN 25 MG/1
25 TABLET, FILM COATED ORAL DAILY
Qty: 90 TABLET | Refills: 3 | OUTPATIENT
Start: 2022-02-11

## 2022-02-11 RX ORDER — NATEGLINIDE 120 MG/1
120 TABLET ORAL
Qty: 270 TABLET | Refills: 3 | OUTPATIENT
Start: 2022-02-11

## 2022-02-11 RX ORDER — LEVOTHYROXINE SODIUM 0.1 MG/1
100 TABLET ORAL EVERY MORNING
Qty: 90 TABLET | Refills: 3 | OUTPATIENT
Start: 2022-02-11

## 2022-02-11 RX ORDER — LOSARTAN POTASSIUM 50 MG/1
50 TABLET ORAL DAILY
Qty: 90 TABLET | Refills: 1 | Status: SHIPPED | OUTPATIENT
Start: 2022-02-11

## 2022-02-11 RX ORDER — HYDROCHLOROTHIAZIDE 25 MG/1
25 TABLET ORAL DAILY
Qty: 90 TABLET | Refills: 3 | Status: SHIPPED | OUTPATIENT
Start: 2022-02-11

## 2022-02-14 RX ORDER — MELOXICAM 15 MG/1
15 TABLET ORAL DAILY
Qty: 30 TABLET | Refills: 0 | Status: SHIPPED | OUTPATIENT
Start: 2022-02-14

## 2022-04-14 ENCOUNTER — OFFICE VISIT (OUTPATIENT)
Dept: INTERNAL MEDICINE CLINIC | Facility: CLINIC | Age: 62
End: 2022-04-14
Payer: MEDICAID

## 2022-04-14 ENCOUNTER — LAB ENCOUNTER (OUTPATIENT)
Dept: LAB | Age: 62
End: 2022-04-14
Attending: INTERNAL MEDICINE
Payer: MEDICAID

## 2022-04-14 VITALS
HEART RATE: 81 BPM | DIASTOLIC BLOOD PRESSURE: 60 MMHG | TEMPERATURE: 98 F | SYSTOLIC BLOOD PRESSURE: 112 MMHG | WEIGHT: 201 LBS | BODY MASS INDEX: 33.49 KG/M2 | OXYGEN SATURATION: 98 % | HEIGHT: 65 IN

## 2022-04-14 DIAGNOSIS — I10 ESSENTIAL HYPERTENSION: ICD-10-CM

## 2022-04-14 DIAGNOSIS — E03.9 HYPOTHYROIDISM: Primary | ICD-10-CM

## 2022-04-14 DIAGNOSIS — E03.9 HYPOTHYROIDISM, UNSPECIFIED TYPE: ICD-10-CM

## 2022-04-14 DIAGNOSIS — E78.5 HYPERLIPIDEMIA, UNSPECIFIED HYPERLIPIDEMIA TYPE: ICD-10-CM

## 2022-04-14 DIAGNOSIS — E11.9 TYPE 2 DIABETES MELLITUS WITHOUT COMPLICATION, WITHOUT LONG-TERM CURRENT USE OF INSULIN (HCC): ICD-10-CM

## 2022-04-14 DIAGNOSIS — M79.605 LEFT LEG PAIN: Primary | ICD-10-CM

## 2022-04-14 DIAGNOSIS — K21.9 GASTROESOPHAGEAL REFLUX DISEASE, UNSPECIFIED WHETHER ESOPHAGITIS PRESENT: ICD-10-CM

## 2022-04-14 PROBLEM — E11.65 TYPE 2 DIABETES MELLITUS WITH HYPERGLYCEMIA (HCC): Status: ACTIVE | Noted: 2022-04-14

## 2022-04-14 LAB
ALBUMIN SERPL-MCNC: 3.8 G/DL (ref 3.4–5)
ALBUMIN/GLOB SERPL: 1 {RATIO} (ref 1–2)
ALP LIVER SERPL-CCNC: 78 U/L
ALT SERPL-CCNC: 22 U/L
ANION GAP SERPL CALC-SCNC: 6 MMOL/L (ref 0–18)
AST SERPL-CCNC: 20 U/L (ref 15–37)
BASOPHILS # BLD AUTO: 0.06 X10(3) UL (ref 0–0.2)
BASOPHILS NFR BLD AUTO: 0.6 %
BILIRUB SERPL-MCNC: 0.3 MG/DL (ref 0.1–2)
BUN BLD-MCNC: 14 MG/DL (ref 7–18)
BUN/CREAT SERPL: 20.9 (ref 10–20)
CALCIUM BLD-MCNC: 9.8 MG/DL (ref 8.5–10.1)
CHLORIDE SERPL-SCNC: 102 MMOL/L (ref 98–112)
CHOLEST SERPL-MCNC: 97 MG/DL (ref ?–200)
CO2 SERPL-SCNC: 31 MMOL/L (ref 21–32)
CREAT BLD-MCNC: 0.67 MG/DL
CREAT UR-SCNC: 86.1 MG/DL
DEPRECATED RDW RBC AUTO: 47.8 FL (ref 35.1–46.3)
EOSINOPHIL # BLD AUTO: 0.08 X10(3) UL (ref 0–0.7)
EOSINOPHIL NFR BLD AUTO: 0.8 %
ERYTHROCYTE [DISTWIDTH] IN BLOOD BY AUTOMATED COUNT: 13.2 % (ref 11–15)
EST. AVERAGE GLUCOSE BLD GHB EST-MCNC: 183 MG/DL (ref 68–126)
FASTING PATIENT LIPID ANSWER: YES
FASTING STATUS PATIENT QL REPORTED: YES
GLOBULIN PLAS-MCNC: 3.8 G/DL (ref 2.8–4.4)
GLUCOSE BLD-MCNC: 124 MG/DL (ref 70–99)
HBA1C MFR BLD: 8 % (ref ?–5.7)
HCT VFR BLD AUTO: 42.8 %
HDLC SERPL-MCNC: 50 MG/DL (ref 40–59)
HGB BLD-MCNC: 13.4 G/DL
IMM GRANULOCYTES # BLD AUTO: 0.06 X10(3) UL (ref 0–1)
IMM GRANULOCYTES NFR BLD: 0.6 %
LDLC SERPL CALC-MCNC: 30 MG/DL (ref ?–100)
LYMPHOCYTES # BLD AUTO: 3.03 X10(3) UL (ref 1–4)
LYMPHOCYTES NFR BLD AUTO: 29.1 %
MCH RBC QN AUTO: 30.8 PG (ref 26–34)
MCHC RBC AUTO-ENTMCNC: 31.3 G/DL (ref 31–37)
MCV RBC AUTO: 98.4 FL
MICROALBUMIN UR-MCNC: 2.26 MG/DL
MICROALBUMIN/CREAT 24H UR-RTO: 26.2 UG/MG (ref ?–30)
MONOCYTES # BLD AUTO: 0.95 X10(3) UL (ref 0.1–1)
MONOCYTES NFR BLD AUTO: 9.1 %
NEUTROPHILS # BLD AUTO: 6.22 X10 (3) UL (ref 1.5–7.7)
NEUTROPHILS # BLD AUTO: 6.22 X10(3) UL (ref 1.5–7.7)
NEUTROPHILS NFR BLD AUTO: 59.8 %
NONHDLC SERPL-MCNC: 47 MG/DL (ref ?–130)
OSMOLALITY SERPL CALC.SUM OF ELEC: 290 MOSM/KG (ref 275–295)
PLATELET # BLD AUTO: 290 10(3)UL (ref 150–450)
POTASSIUM SERPL-SCNC: 3.6 MMOL/L (ref 3.5–5.1)
PROT SERPL-MCNC: 7.6 G/DL (ref 6.4–8.2)
RBC # BLD AUTO: 4.35 X10(6)UL
SODIUM SERPL-SCNC: 139 MMOL/L (ref 136–145)
T4 FREE SERPL-MCNC: 1.8 NG/DL (ref 0.8–1.7)
TRIGL SERPL-MCNC: 89 MG/DL (ref 30–149)
TSI SER-ACNC: 0.81 MIU/ML (ref 0.36–3.74)
VLDLC SERPL CALC-MCNC: 12 MG/DL (ref 0–30)
WBC # BLD AUTO: 10.4 X10(3) UL (ref 4–11)

## 2022-04-14 PROCEDURE — 99214 OFFICE O/P EST MOD 30 MIN: CPT | Performed by: INTERNAL MEDICINE

## 2022-04-14 PROCEDURE — 3052F HG A1C>EQUAL 8.0%<EQUAL 9.0%: CPT | Performed by: INTERNAL MEDICINE

## 2022-04-14 PROCEDURE — 82570 ASSAY OF URINE CREATININE: CPT | Performed by: INTERNAL MEDICINE

## 2022-04-14 PROCEDURE — 80061 LIPID PANEL: CPT

## 2022-04-14 PROCEDURE — 3074F SYST BP LT 130 MM HG: CPT | Performed by: INTERNAL MEDICINE

## 2022-04-14 PROCEDURE — 36415 COLL VENOUS BLD VENIPUNCTURE: CPT | Performed by: INTERNAL MEDICINE

## 2022-04-14 PROCEDURE — 84439 ASSAY OF FREE THYROXINE: CPT

## 2022-04-14 PROCEDURE — 3008F BODY MASS INDEX DOCD: CPT | Performed by: INTERNAL MEDICINE

## 2022-04-14 PROCEDURE — 84443 ASSAY THYROID STIM HORMONE: CPT

## 2022-04-14 PROCEDURE — 85025 COMPLETE CBC W/AUTO DIFF WBC: CPT | Performed by: INTERNAL MEDICINE

## 2022-04-14 PROCEDURE — 80053 COMPREHEN METABOLIC PANEL: CPT | Performed by: INTERNAL MEDICINE

## 2022-04-14 PROCEDURE — 3078F DIAST BP <80 MM HG: CPT | Performed by: INTERNAL MEDICINE

## 2022-04-14 PROCEDURE — 83036 HEMOGLOBIN GLYCOSYLATED A1C: CPT | Performed by: INTERNAL MEDICINE

## 2022-04-14 PROCEDURE — 82043 UR ALBUMIN QUANTITATIVE: CPT | Performed by: INTERNAL MEDICINE

## 2022-04-14 RX ORDER — SITAGLIPTIN 100 MG/1
TABLET, FILM COATED ORAL
COMMUNITY
Start: 2021-11-26 | End: 2022-04-14

## 2022-04-15 RX ORDER — LEVOTHYROXINE SODIUM 0.07 MG/1
75 TABLET ORAL
Qty: 90 TABLET | Refills: 3 | Status: SHIPPED | OUTPATIENT
Start: 2022-04-15

## 2022-04-18 RX ORDER — EMPAGLIFLOZIN 25 MG/1
25 TABLET, FILM COATED ORAL DAILY
Qty: 30 TABLET | Refills: 11 | Status: SHIPPED | OUTPATIENT
Start: 2022-04-18

## 2022-04-25 ENCOUNTER — TELEPHONE (OUTPATIENT)
Dept: INTERNAL MEDICINE CLINIC | Facility: CLINIC | Age: 62
End: 2022-04-25

## 2022-04-25 NOTE — TELEPHONE ENCOUNTER
Patient's daughter, Joe Hatch is calling to requestin an order for physical therapy. Patient last saw Dr Pieter Anglin on 4/14/2022 and was given an order for [facility]. Patient's insurance does not cover [facility] and is now looking for a different order for a different location.  # [phone], Joe Hatch is listed on patient's most recent HIPAA as an emergency contact but patient did not jean whether or not information can be shared with her.

## 2022-04-25 NOTE — TELEPHONE ENCOUNTER
Called  per hipaa and explained that he needs to call insurance to find out which PT is in network , then call us so referral can be started - verbalized understanding

## 2022-04-26 ENCOUNTER — OFFICE VISIT (OUTPATIENT)
Dept: PHYSICAL THERAPY | Age: 62
End: 2022-04-26
Attending: INTERNAL MEDICINE
Payer: MEDICAID

## 2022-04-26 ENCOUNTER — ORDER TRANSCRIPTION (OUTPATIENT)
Dept: PHYSICAL THERAPY | Facility: HOSPITAL | Age: 62
End: 2022-04-26

## 2022-04-26 DIAGNOSIS — M79.605 LEFT LEG PAIN: ICD-10-CM

## 2022-04-26 PROCEDURE — 97162 PT EVAL MOD COMPLEX 30 MIN: CPT

## 2022-04-26 PROCEDURE — 97530 THERAPEUTIC ACTIVITIES: CPT

## 2022-05-04 ENCOUNTER — OFFICE VISIT (OUTPATIENT)
Dept: PHYSICAL THERAPY | Age: 62
End: 2022-05-04
Attending: INTERNAL MEDICINE
Payer: MEDICAID

## 2022-05-04 DIAGNOSIS — M79.605 LEFT LEG PAIN: ICD-10-CM

## 2022-05-04 PROCEDURE — 97530 THERAPEUTIC ACTIVITIES: CPT

## 2022-05-04 PROCEDURE — 97140 MANUAL THERAPY 1/> REGIONS: CPT

## 2022-05-04 PROCEDURE — 97110 THERAPEUTIC EXERCISES: CPT

## 2022-05-09 ENCOUNTER — OFFICE VISIT (OUTPATIENT)
Dept: PHYSICAL THERAPY | Age: 62
End: 2022-05-09
Attending: INTERNAL MEDICINE
Payer: MEDICAID

## 2022-05-09 DIAGNOSIS — M79.605 LEFT LEG PAIN: ICD-10-CM

## 2022-05-09 PROCEDURE — 97140 MANUAL THERAPY 1/> REGIONS: CPT

## 2022-05-09 PROCEDURE — 97110 THERAPEUTIC EXERCISES: CPT

## 2022-05-16 NOTE — TELEPHONE ENCOUNTER
Please call patient ( two attempts two days apart) to book FU  Once FU / attempts are made, please route back to me for refill  Thanks

## 2022-05-20 NOTE — TELEPHONE ENCOUNTER
Called to help schedule a fu appt, pt answered and states she does not feel the need to follow up with Dr. Kaley Martinez and would prefer to follow up with her PCP

## 2022-05-23 RX ORDER — PIOGLITAZONEHYDROCHLORIDE 45 MG/1
45 TABLET ORAL DAILY
Qty: 90 TABLET | Refills: 0 | Status: SHIPPED | OUTPATIENT
Start: 2022-05-23

## 2022-05-23 RX ORDER — PIOGLITAZONEHYDROCHLORIDE 45 MG/1
45 TABLET ORAL DAILY
Qty: 30 TABLET | Refills: 0 | OUTPATIENT
Start: 2022-05-23 | End: 2022-06-22

## 2022-05-31 ENCOUNTER — APPOINTMENT (OUTPATIENT)
Dept: PHYSICAL THERAPY | Age: 62
End: 2022-05-31
Attending: INTERNAL MEDICINE
Payer: MEDICAID

## 2022-06-02 ENCOUNTER — OFFICE VISIT (OUTPATIENT)
Dept: PHYSICAL THERAPY | Age: 62
End: 2022-06-02
Attending: INTERNAL MEDICINE
Payer: MEDICAID

## 2022-06-02 DIAGNOSIS — M79.605 LEFT LEG PAIN: ICD-10-CM

## 2022-06-02 PROCEDURE — 97110 THERAPEUTIC EXERCISES: CPT

## 2022-06-02 PROCEDURE — 97140 MANUAL THERAPY 1/> REGIONS: CPT

## 2022-06-06 ENCOUNTER — OFFICE VISIT (OUTPATIENT)
Dept: PHYSICAL THERAPY | Age: 62
End: 2022-06-06
Attending: INTERNAL MEDICINE
Payer: MEDICAID

## 2022-06-06 DIAGNOSIS — M79.605 LEFT LEG PAIN: ICD-10-CM

## 2022-06-06 PROCEDURE — 97140 MANUAL THERAPY 1/> REGIONS: CPT

## 2022-06-06 PROCEDURE — 97110 THERAPEUTIC EXERCISES: CPT

## 2022-06-06 PROCEDURE — 97530 THERAPEUTIC ACTIVITIES: CPT

## 2022-06-08 ENCOUNTER — OFFICE VISIT (OUTPATIENT)
Dept: PHYSICAL THERAPY | Age: 62
End: 2022-06-08
Attending: INTERNAL MEDICINE
Payer: MEDICAID

## 2022-06-08 DIAGNOSIS — M79.605 LEFT LEG PAIN: ICD-10-CM

## 2022-06-08 PROCEDURE — 97140 MANUAL THERAPY 1/> REGIONS: CPT

## 2022-06-08 PROCEDURE — 97110 THERAPEUTIC EXERCISES: CPT

## 2022-06-13 ENCOUNTER — OFFICE VISIT (OUTPATIENT)
Dept: PHYSICAL THERAPY | Age: 62
End: 2022-06-13
Attending: INTERNAL MEDICINE
Payer: MEDICAID

## 2022-06-13 DIAGNOSIS — M79.605 LEFT LEG PAIN: ICD-10-CM

## 2022-06-13 PROCEDURE — 97140 MANUAL THERAPY 1/> REGIONS: CPT

## 2022-06-13 PROCEDURE — 97110 THERAPEUTIC EXERCISES: CPT

## 2022-06-15 ENCOUNTER — OFFICE VISIT (OUTPATIENT)
Dept: PHYSICAL THERAPY | Age: 62
End: 2022-06-15
Attending: INTERNAL MEDICINE
Payer: MEDICAID

## 2022-06-15 DIAGNOSIS — M79.605 LEFT LEG PAIN: ICD-10-CM

## 2022-06-15 PROCEDURE — 97530 THERAPEUTIC ACTIVITIES: CPT

## 2022-06-15 PROCEDURE — 97140 MANUAL THERAPY 1/> REGIONS: CPT

## 2022-06-15 PROCEDURE — 97110 THERAPEUTIC EXERCISES: CPT

## 2022-06-20 ENCOUNTER — TELEPHONE (OUTPATIENT)
Dept: PHYSICAL THERAPY | Age: 62
End: 2022-06-20

## 2022-06-21 ENCOUNTER — OFFICE VISIT (OUTPATIENT)
Dept: PHYSICAL THERAPY | Age: 62
End: 2022-06-21
Attending: INTERNAL MEDICINE
Payer: MEDICAID

## 2022-06-21 PROCEDURE — 97110 THERAPEUTIC EXERCISES: CPT

## 2022-06-27 DIAGNOSIS — E03.9 HYPOTHYROIDISM, UNSPECIFIED TYPE: ICD-10-CM

## 2022-06-27 RX ORDER — MELOXICAM 15 MG/1
15 TABLET ORAL DAILY
Qty: 30 TABLET | Refills: 0 | Status: CANCELLED | OUTPATIENT
Start: 2022-06-27

## 2022-06-27 RX ORDER — LEVOTHYROXINE SODIUM 0.07 MG/1
75 TABLET ORAL
Qty: 90 TABLET | Refills: 3 | Status: CANCELLED | OUTPATIENT
Start: 2022-06-27

## 2022-06-27 RX ORDER — LOSARTAN POTASSIUM 50 MG/1
50 TABLET ORAL DAILY
Qty: 90 TABLET | Refills: 1 | Status: CANCELLED | OUTPATIENT
Start: 2022-06-27

## 2022-06-27 RX ORDER — HYDROCHLOROTHIAZIDE 25 MG/1
25 TABLET ORAL DAILY
Qty: 90 TABLET | Refills: 3 | Status: CANCELLED | OUTPATIENT
Start: 2022-06-27

## 2022-06-27 RX ORDER — EMPAGLIFLOZIN 25 MG/1
25 TABLET, FILM COATED ORAL DAILY
Qty: 30 TABLET | Refills: 11 | Status: CANCELLED | OUTPATIENT
Start: 2022-06-27

## 2022-06-27 RX ORDER — BLOOD SUGAR DIAGNOSTIC
STRIP MISCELLANEOUS
Qty: 200 EACH | Refills: 3 | Status: CANCELLED | OUTPATIENT
Start: 2022-06-27

## 2022-06-27 RX ORDER — ROSUVASTATIN CALCIUM 10 MG/1
10 TABLET, COATED ORAL NIGHTLY
Qty: 90 TABLET | Refills: 3 | Status: CANCELLED | OUTPATIENT
Start: 2022-06-27

## 2022-06-27 RX ORDER — PIOGLITAZONEHYDROCHLORIDE 45 MG/1
45 TABLET ORAL DAILY
Qty: 90 TABLET | Refills: 0 | Status: CANCELLED | OUTPATIENT
Start: 2022-06-27

## 2022-06-27 RX ORDER — PIOGLITAZONEHYDROCHLORIDE 45 MG/1
TABLET ORAL
Qty: 90 TABLET | Refills: 0 | Status: SHIPPED | OUTPATIENT
Start: 2022-06-27

## 2022-06-27 NOTE — TELEPHONE ENCOUNTER
Patient no longer follows up with Dr. Rhina Mcginnis. Last refilled by PCP 05/23/22.  Routed to provider for refusal.

## 2022-07-24 RX ORDER — LOSARTAN POTASSIUM 50 MG/1
TABLET ORAL
Qty: 90 TABLET | Refills: 3 | Status: SHIPPED | OUTPATIENT
Start: 2022-07-24

## 2022-07-25 ENCOUNTER — APPOINTMENT (OUTPATIENT)
Dept: PHYSICAL THERAPY | Age: 62
End: 2022-07-25
Attending: INTERNAL MEDICINE
Payer: MEDICAID

## 2022-08-03 ENCOUNTER — LAB ENCOUNTER (OUTPATIENT)
Dept: LAB | Age: 62
End: 2022-08-03
Attending: INTERNAL MEDICINE
Payer: MEDICAID

## 2022-08-03 ENCOUNTER — OFFICE VISIT (OUTPATIENT)
Dept: INTERNAL MEDICINE CLINIC | Facility: CLINIC | Age: 62
End: 2022-08-03
Payer: MEDICAID

## 2022-08-03 VITALS
HEART RATE: 74 BPM | BODY MASS INDEX: 32.62 KG/M2 | SYSTOLIC BLOOD PRESSURE: 118 MMHG | WEIGHT: 195.81 LBS | DIASTOLIC BLOOD PRESSURE: 68 MMHG | HEIGHT: 65 IN | TEMPERATURE: 97 F | OXYGEN SATURATION: 97 %

## 2022-08-03 DIAGNOSIS — E78.5 HYPERLIPIDEMIA, UNSPECIFIED HYPERLIPIDEMIA TYPE: ICD-10-CM

## 2022-08-03 DIAGNOSIS — E03.9 HYPOTHYROIDISM, UNSPECIFIED TYPE: ICD-10-CM

## 2022-08-03 DIAGNOSIS — F32.A DEPRESSION, UNSPECIFIED DEPRESSION TYPE: ICD-10-CM

## 2022-08-03 DIAGNOSIS — Z12.9 CANCER SCREENING: ICD-10-CM

## 2022-08-03 DIAGNOSIS — I10 ESSENTIAL HYPERTENSION: Primary | ICD-10-CM

## 2022-08-03 DIAGNOSIS — M79.652 LEFT THIGH PAIN: ICD-10-CM

## 2022-08-03 DIAGNOSIS — K21.9 GASTROESOPHAGEAL REFLUX DISEASE, UNSPECIFIED WHETHER ESOPHAGITIS PRESENT: ICD-10-CM

## 2022-08-03 DIAGNOSIS — E11.9 TYPE 2 DIABETES MELLITUS WITHOUT COMPLICATION, WITHOUT LONG-TERM CURRENT USE OF INSULIN (HCC): ICD-10-CM

## 2022-08-03 LAB
EST. AVERAGE GLUCOSE BLD GHB EST-MCNC: 177 MG/DL (ref 68–126)
HBA1C MFR BLD: 7.8 % (ref ?–5.7)
T4 FREE SERPL-MCNC: 1.3 NG/DL (ref 0.8–1.7)
TSI SER-ACNC: 1.16 MIU/ML (ref 0.36–3.74)

## 2022-08-03 PROCEDURE — 84443 ASSAY THYROID STIM HORMONE: CPT

## 2022-08-03 PROCEDURE — 83036 HEMOGLOBIN GLYCOSYLATED A1C: CPT | Performed by: INTERNAL MEDICINE

## 2022-08-03 PROCEDURE — 84439 ASSAY OF FREE THYROXINE: CPT

## 2022-08-03 PROCEDURE — 3078F DIAST BP <80 MM HG: CPT | Performed by: INTERNAL MEDICINE

## 2022-08-03 PROCEDURE — 3008F BODY MASS INDEX DOCD: CPT | Performed by: INTERNAL MEDICINE

## 2022-08-03 PROCEDURE — 99213 OFFICE O/P EST LOW 20 MIN: CPT | Performed by: INTERNAL MEDICINE

## 2022-08-03 PROCEDURE — 3051F HG A1C>EQUAL 7.0%<8.0%: CPT | Performed by: INTERNAL MEDICINE

## 2022-08-03 PROCEDURE — 3074F SYST BP LT 130 MM HG: CPT | Performed by: INTERNAL MEDICINE

## 2022-08-03 PROCEDURE — 36415 COLL VENOUS BLD VENIPUNCTURE: CPT

## 2022-08-03 RX ORDER — ALPRAZOLAM 0.25 MG/1
0.25 TABLET ORAL NIGHTLY PRN
Qty: 30 TABLET | Refills: 0 | Status: SHIPPED | OUTPATIENT
Start: 2022-08-03

## 2022-09-15 ENCOUNTER — HOSPITAL ENCOUNTER (OUTPATIENT)
Dept: MAMMOGRAPHY | Facility: HOSPITAL | Age: 62
Discharge: HOME | End: 2022-09-15
Attending: INTERNAL MEDICINE
Payer: MEDICAID

## 2022-09-15 DIAGNOSIS — Z12.9 CANCER SCREENING: ICD-10-CM

## 2022-09-15 PROCEDURE — 77067 SCR MAMMO BI INCL CAD: CPT | Performed by: INTERNAL MEDICINE

## 2022-09-15 PROCEDURE — 77063 BREAST TOMOSYNTHESIS BI: CPT | Performed by: INTERNAL MEDICINE

## 2022-10-07 ENCOUNTER — TELEPHONE (OUTPATIENT)
Dept: INTERNAL MEDICINE CLINIC | Facility: CLINIC | Age: 62
End: 2022-10-07

## 2022-10-09 NOTE — TELEPHONE ENCOUNTER
To Dr. Leo Courtney    The above refill request is for a controlled substance. Please review pended medication order.        lov 8/3  Prescribed #30 8/3   \"

## 2022-10-10 RX ORDER — ALPRAZOLAM 0.25 MG/1
TABLET ORAL
Qty: 30 TABLET | Refills: 0 | Status: SHIPPED | OUTPATIENT
Start: 2022-10-10

## 2022-10-17 RX ORDER — MELOXICAM 15 MG/1
TABLET ORAL
Qty: 30 TABLET | Refills: 0 | OUTPATIENT
Start: 2022-10-17

## 2022-10-17 NOTE — TELEPHONE ENCOUNTER
I called patient and told her she would have to contact Dr. Angella Grant office for a refill of meloxicam.  She saw Dr. Maurizio Espinal over 1 year ago

## 2022-11-14 RX ORDER — PIOGLITAZONEHYDROCHLORIDE 45 MG/1
TABLET ORAL
Qty: 90 TABLET | Refills: 0 | Status: SHIPPED | OUTPATIENT
Start: 2022-11-14

## 2022-12-01 ENCOUNTER — OFFICE VISIT (OUTPATIENT)
Dept: INTERNAL MEDICINE CLINIC | Facility: CLINIC | Age: 62
End: 2022-12-01
Payer: MEDICAID

## 2022-12-01 ENCOUNTER — LAB ENCOUNTER (OUTPATIENT)
Dept: LAB | Age: 62
End: 2022-12-01
Attending: INTERNAL MEDICINE
Payer: MEDICAID

## 2022-12-01 VITALS
DIASTOLIC BLOOD PRESSURE: 68 MMHG | OXYGEN SATURATION: 98 % | HEIGHT: 65 IN | SYSTOLIC BLOOD PRESSURE: 104 MMHG | BODY MASS INDEX: 33.15 KG/M2 | TEMPERATURE: 98 F | HEART RATE: 69 BPM | WEIGHT: 199 LBS

## 2022-12-01 DIAGNOSIS — M79.641 RIGHT HAND PAIN: ICD-10-CM

## 2022-12-01 DIAGNOSIS — E03.9 HYPOTHYROIDISM, UNSPECIFIED TYPE: ICD-10-CM

## 2022-12-01 DIAGNOSIS — K21.9 GASTROESOPHAGEAL REFLUX DISEASE, UNSPECIFIED WHETHER ESOPHAGITIS PRESENT: ICD-10-CM

## 2022-12-01 DIAGNOSIS — I10 ESSENTIAL HYPERTENSION: Primary | ICD-10-CM

## 2022-12-01 DIAGNOSIS — E78.5 HYPERLIPIDEMIA, UNSPECIFIED HYPERLIPIDEMIA TYPE: ICD-10-CM

## 2022-12-01 DIAGNOSIS — E11.65 TYPE 2 DIABETES MELLITUS WITH HYPERGLYCEMIA, WITHOUT LONG-TERM CURRENT USE OF INSULIN (HCC): ICD-10-CM

## 2022-12-01 LAB
ALBUMIN SERPL-MCNC: 4.1 G/DL (ref 3.4–5)
ALBUMIN/GLOB SERPL: 1.2 {RATIO} (ref 1–2)
ALP LIVER SERPL-CCNC: 81 U/L
ALT SERPL-CCNC: 25 U/L
ANION GAP SERPL CALC-SCNC: 5 MMOL/L (ref 0–18)
AST SERPL-CCNC: 17 U/L (ref 15–37)
BILIRUB SERPL-MCNC: 0.4 MG/DL (ref 0.1–2)
BUN BLD-MCNC: 23 MG/DL (ref 7–18)
BUN/CREAT SERPL: 31.1 (ref 10–20)
CALCIUM BLD-MCNC: 10.4 MG/DL (ref 8.5–10.1)
CHLORIDE SERPL-SCNC: 102 MMOL/L (ref 98–112)
CHOLEST SERPL-MCNC: 128 MG/DL (ref ?–200)
CO2 SERPL-SCNC: 31 MMOL/L (ref 21–32)
CREAT BLD-MCNC: 0.74 MG/DL
EST. AVERAGE GLUCOSE BLD GHB EST-MCNC: 180 MG/DL (ref 68–126)
FASTING PATIENT LIPID ANSWER: NO
FASTING STATUS PATIENT QL REPORTED: NO
GFR SERPLBLD BASED ON 1.73 SQ M-ARVRAT: 93 ML/MIN/1.73M2 (ref 60–?)
GLOBULIN PLAS-MCNC: 3.5 G/DL (ref 2.8–4.4)
GLUCOSE BLD-MCNC: 133 MG/DL (ref 70–99)
HBA1C MFR BLD: 7.9 % (ref ?–5.7)
HDLC SERPL-MCNC: 63 MG/DL (ref 40–59)
LDLC SERPL CALC-MCNC: 49 MG/DL (ref ?–100)
NONHDLC SERPL-MCNC: 65 MG/DL (ref ?–130)
OSMOLALITY SERPL CALC.SUM OF ELEC: 292 MOSM/KG (ref 275–295)
POTASSIUM SERPL-SCNC: 4.2 MMOL/L (ref 3.5–5.1)
PROT SERPL-MCNC: 7.6 G/DL (ref 6.4–8.2)
SODIUM SERPL-SCNC: 138 MMOL/L (ref 136–145)
T4 FREE SERPL-MCNC: 1.2 NG/DL (ref 0.8–1.7)
TRIGL SERPL-MCNC: 83 MG/DL (ref 30–149)
TSI SER-ACNC: 1.27 MIU/ML (ref 0.36–3.74)
VLDLC SERPL CALC-MCNC: 12 MG/DL (ref 0–30)

## 2022-12-01 PROCEDURE — 3074F SYST BP LT 130 MM HG: CPT | Performed by: INTERNAL MEDICINE

## 2022-12-01 PROCEDURE — 3078F DIAST BP <80 MM HG: CPT | Performed by: INTERNAL MEDICINE

## 2022-12-01 PROCEDURE — 36415 COLL VENOUS BLD VENIPUNCTURE: CPT | Performed by: INTERNAL MEDICINE

## 2022-12-01 PROCEDURE — 3008F BODY MASS INDEX DOCD: CPT | Performed by: INTERNAL MEDICINE

## 2022-12-01 PROCEDURE — 80053 COMPREHEN METABOLIC PANEL: CPT | Performed by: INTERNAL MEDICINE

## 2022-12-01 PROCEDURE — 84443 ASSAY THYROID STIM HORMONE: CPT

## 2022-12-01 PROCEDURE — 90686 IIV4 VACC NO PRSV 0.5 ML IM: CPT | Performed by: INTERNAL MEDICINE

## 2022-12-01 PROCEDURE — 84439 ASSAY OF FREE THYROXINE: CPT

## 2022-12-01 PROCEDURE — 99214 OFFICE O/P EST MOD 30 MIN: CPT | Performed by: INTERNAL MEDICINE

## 2022-12-01 PROCEDURE — 83036 HEMOGLOBIN GLYCOSYLATED A1C: CPT | Performed by: INTERNAL MEDICINE

## 2022-12-01 PROCEDURE — 80061 LIPID PANEL: CPT | Performed by: INTERNAL MEDICINE

## 2022-12-01 PROCEDURE — 90471 IMMUNIZATION ADMIN: CPT | Performed by: INTERNAL MEDICINE

## 2022-12-01 RX ORDER — ESCITALOPRAM OXALATE 10 MG/1
10 TABLET ORAL DAILY
Qty: 30 TABLET | Refills: 6 | Status: SHIPPED | OUTPATIENT
Start: 2022-12-01

## 2022-12-02 ENCOUNTER — TELEPHONE (OUTPATIENT)
Dept: INTERNAL MEDICINE CLINIC | Facility: CLINIC | Age: 62
End: 2022-12-02

## 2022-12-02 NOTE — TELEPHONE ENCOUNTER
Spoke to patient and relayed MD message through [facility] , [place]. Patient verbalized understanding and denied any questions at this time.

## 2022-12-02 NOTE — TELEPHONE ENCOUNTER
----- Message from Carmelo Leonardo MD sent at 12/2/2022  7:51 AM CST -----  Lab results reviewed. Hemoglobin A1c is 7.9--essentially unchanged from 4 months ago when it was 7.8. Ideally, we would like to have this number less than 7. Patient is already on maximum dose of pioglitazone, metformin, Jardiance and nateglinide. She has declined initiation of insulin. Therefore will continue same medication for diabetes. However patient needs to be much more aggressive with dietary measures. Cholesterol is very good at 128 with LDL of 49. Electrolytes, kidney function and liver function tests are all normal.    Thyroid function tests are normal.    Therefore, continue same medications.

## 2022-12-13 ENCOUNTER — TELEPHONE (OUTPATIENT)
Dept: INTERNAL MEDICINE CLINIC | Facility: CLINIC | Age: 62
End: 2022-12-13

## 2022-12-13 ENCOUNTER — PATIENT MESSAGE (OUTPATIENT)
Dept: INTERNAL MEDICINE CLINIC | Facility: CLINIC | Age: 62
End: 2022-12-13

## 2022-12-13 DIAGNOSIS — R20.2 PARESTHESIA OF HAND, BILATERAL: Primary | ICD-10-CM

## 2022-12-13 RX ORDER — ALPRAZOLAM 0.25 MG/1
0.25 TABLET ORAL NIGHTLY PRN
Qty: 30 TABLET | Refills: 1 | Status: SHIPPED | OUTPATIENT
Start: 2022-12-13

## 2022-12-13 NOTE — TELEPHONE ENCOUNTER
To Dr. Jose Harris, referral pended for hand surgeon, for review. Patient continuing to have right hand pain.    Per your 12/01/22 note If no significant improvement, may need further evaluation including EMG/nerve conduction study

## 2022-12-13 NOTE — TELEPHONE ENCOUNTER
From: Pa Goldstein  To: Leander Rolon MD  Sent: 12/13/2022 4:49 PM CST  Subject: Referral for hand doctor    Brandon Case, Could you please refer me to a hand doctor? I have tried sleeping with the hand splint you recommended, but my hands still hurt a lot. Thank you!

## 2022-12-13 NOTE — TELEPHONE ENCOUNTER
Brandon Robert, Could you please refer me to a hand doctor? I have tried sleeping with the hand splint you recommended, but my hands still hurt a lot. Thank you!

## 2022-12-13 NOTE — TELEPHONE ENCOUNTER
Would first need EMG/nerve conduction study to evaluate for carpal tunnel syndrome--patient should get this done and if this is positive, would then be appropriate to see hand surgeon.

## 2022-12-13 NOTE — TELEPHONE ENCOUNTER
To MD:  The above refill request is for a controlled substance. Please review pended medication order. Print and sign for staff to fax to pharmacy or prescribe electronically.     Last office visit: 12/1/22  Last time refill sent and quantity/refills: 10/10/22 #30 with 0   [place] , last dispensed 10/10/22

## 2022-12-14 DIAGNOSIS — R20.2 RIGHT HAND PARESTHESIA: Primary | ICD-10-CM

## 2022-12-14 NOTE — TELEPHONE ENCOUNTER
Is the EMG/nerve study something we order ?  Or does does the patient need to see someone (specialist in particular) does he need referral for this if he agrees Please advise

## 2022-12-14 NOTE — TELEPHONE ENCOUNTER
Spoke to patient and advised her per MD message below, central scheduling number was also given to patient for her to schedule, per patient states that the study should be done on both hands as it is both that she is having pain with, order is only for right arm and she stated that it is her left arm that hurts more as of right now. Okay to change the order to bilateral .  Please advise

## 2022-12-15 NOTE — TELEPHONE ENCOUNTER
Called patient and advised that order had been changed to bilateral of hands and she verbalized understanding and agreement.

## 2023-01-13 RX ORDER — NATEGLINIDE 120 MG/1
TABLET ORAL
Qty: 270 TABLET | Refills: 3 | Status: SHIPPED | OUTPATIENT
Start: 2023-01-13

## 2023-02-10 DIAGNOSIS — E03.9 HYPOTHYROIDISM, UNSPECIFIED TYPE: ICD-10-CM

## 2023-02-13 ENCOUNTER — TELEPHONE (OUTPATIENT)
Dept: INTERNAL MEDICINE CLINIC | Facility: CLINIC | Age: 63
End: 2023-02-13

## 2023-02-13 NOTE — TELEPHONE ENCOUNTER
Received request for Levothyroxine  0.075mcg from [facility]     Second request    Placed in yellow folder

## 2023-02-14 RX ORDER — HYDROCHLOROTHIAZIDE 25 MG/1
TABLET ORAL
Qty: 90 TABLET | Refills: 3 | Status: SHIPPED | OUTPATIENT
Start: 2023-02-14

## 2023-02-14 RX ORDER — MELOXICAM 15 MG/1
TABLET ORAL
Qty: 90 TABLET | Refills: 3 | Status: SHIPPED | OUTPATIENT
Start: 2023-02-14

## 2023-02-14 RX ORDER — LEVOTHYROXINE SODIUM 0.07 MG/1
TABLET ORAL
Qty: 90 TABLET | Refills: 3 | Status: SHIPPED | OUTPATIENT
Start: 2023-02-14

## 2023-02-14 RX ORDER — PIOGLITAZONEHYDROCHLORIDE 45 MG/1
TABLET ORAL
Qty: 90 TABLET | Refills: 3 | Status: SHIPPED | OUTPATIENT
Start: 2023-02-14

## 2023-02-21 ENCOUNTER — OFFICE VISIT (OUTPATIENT)
Dept: OPHTHALMOLOGY | Facility: CLINIC | Age: 63
End: 2023-02-21

## 2023-02-21 DIAGNOSIS — E11.9 DIABETES MELLITUS TYPE 2 WITHOUT RETINOPATHY (HCC): Primary | ICD-10-CM

## 2023-02-21 DIAGNOSIS — H25.13 AGE-RELATED NUCLEAR CATARACT OF BOTH EYES: ICD-10-CM

## 2023-02-21 DIAGNOSIS — H52.4 PRESBYOPIA OF BOTH EYES: ICD-10-CM

## 2023-02-21 PROCEDURE — 2023F DILAT RTA XM W/O RTNOPTHY: CPT | Performed by: OPHTHALMOLOGY

## 2023-02-21 PROCEDURE — 92004 COMPRE OPH EXAM NEW PT 1/>: CPT | Performed by: OPHTHALMOLOGY

## 2023-02-21 NOTE — PATIENT INSTRUCTIONS
Presbyopia of both eyes  Suggest +2.25 OTC reading glasses. Age-related nuclear cataract of both eyes  Discussed early cataracts with patient. Told patient that cataracts are age appropriate and they are not surgical at this time. No treatment recommended at this time. Diabetes mellitus type 2 without retinopathy ([address]  Diabetes type II: no background of retinopathy, no signs of neovascularization noted. Discussed ocular and systemic benefits of blood sugar control. Diagnosis and treatment discussed in detail with patient.

## 2023-03-23 ENCOUNTER — OFFICE VISIT (OUTPATIENT)
Dept: INTERNAL MEDICINE CLINIC | Facility: CLINIC | Age: 63
End: 2023-03-23

## 2023-03-23 VITALS
WEIGHT: 202.63 LBS | HEART RATE: 70 BPM | TEMPERATURE: 98 F | DIASTOLIC BLOOD PRESSURE: 68 MMHG | HEIGHT: 65 IN | OXYGEN SATURATION: 97 % | BODY MASS INDEX: 33.76 KG/M2 | SYSTOLIC BLOOD PRESSURE: 108 MMHG

## 2023-03-23 DIAGNOSIS — F32.A DEPRESSION, UNSPECIFIED DEPRESSION TYPE: ICD-10-CM

## 2023-03-23 DIAGNOSIS — E03.9 HYPOTHYROIDISM, UNSPECIFIED TYPE: ICD-10-CM

## 2023-03-23 DIAGNOSIS — E11.9 DIABETES MELLITUS TYPE 2 WITHOUT RETINOPATHY (HCC): Primary | ICD-10-CM

## 2023-03-23 DIAGNOSIS — I10 ESSENTIAL HYPERTENSION: ICD-10-CM

## 2023-03-23 DIAGNOSIS — L29.9 PRURITUS: ICD-10-CM

## 2023-03-23 LAB
CARTRIDGE LOT#: ABNORMAL NUMERIC
HEMOGLOBIN A1C: 7.4 % (ref 4.3–5.6)

## 2023-03-23 PROCEDURE — 3008F BODY MASS INDEX DOCD: CPT | Performed by: INTERNAL MEDICINE

## 2023-03-23 PROCEDURE — 3078F DIAST BP <80 MM HG: CPT | Performed by: INTERNAL MEDICINE

## 2023-03-23 PROCEDURE — 83036 HEMOGLOBIN GLYCOSYLATED A1C: CPT | Performed by: INTERNAL MEDICINE

## 2023-03-23 PROCEDURE — 3051F HG A1C>EQUAL 7.0%<8.0%: CPT | Performed by: INTERNAL MEDICINE

## 2023-03-23 PROCEDURE — 3074F SYST BP LT 130 MM HG: CPT | Performed by: INTERNAL MEDICINE

## 2023-03-23 PROCEDURE — 99213 OFFICE O/P EST LOW 20 MIN: CPT | Performed by: INTERNAL MEDICINE

## 2023-03-23 RX ORDER — TRIAMCINOLONE ACETONIDE 1 MG/G
CREAM TOPICAL 2 TIMES DAILY
Qty: 60 G | Refills: 3 | Status: SHIPPED | OUTPATIENT
Start: 2023-03-23

## 2023-04-12 ENCOUNTER — TELEPHONE (OUTPATIENT)
Dept: INTERNAL MEDICINE CLINIC | Facility: CLINIC | Age: 63
End: 2023-04-12

## 2023-04-12 RX ORDER — ALPRAZOLAM 0.25 MG/1
TABLET ORAL
Qty: 30 TABLET | Refills: 0 | Status: SHIPPED | OUTPATIENT
Start: 2023-04-12

## 2023-04-12 RX ORDER — EMPAGLIFLOZIN 25 MG/1
TABLET, FILM COATED ORAL
Qty: 30 TABLET | Refills: 11 | Status: SHIPPED | OUTPATIENT
Start: 2023-04-12

## 2023-04-12 NOTE — TELEPHONE ENCOUNTER
To MD:  One of the above refill request is for a controlled substance. Please review pended medication order. Print and sign for staff to fax to pharmacy or prescribe electronically.     Last office visit:  Last time refill sent and quantity/refills: 3/23/23  Alprazolam last dispensed 2/10/23 / written 12/13/22 for #30 w/1RF to [place]

## 2023-04-13 RX ORDER — ROSUVASTATIN CALCIUM 10 MG/1
TABLET, COATED ORAL
Qty: 90 TABLET | Refills: 3 | Status: SHIPPED | OUTPATIENT
Start: 2023-04-13

## 2023-04-13 RX ORDER — EMPAGLIFLOZIN 25 MG/1
TABLET, FILM COATED ORAL
Qty: 30 TABLET | Refills: 11 | OUTPATIENT
Start: 2023-04-13

## 2023-05-08 ENCOUNTER — OFFICE VISIT (OUTPATIENT)
Dept: ELECTROPHYSIOLOGY | Facility: HOSPITAL | Age: 63
End: 2023-05-08
Attending: INTERNAL MEDICINE
Payer: MEDICAID

## 2023-05-08 DIAGNOSIS — R20.2 PARESTHESIA OF HAND, BILATERAL: ICD-10-CM

## 2023-05-08 PROCEDURE — 95886 MUSC TEST DONE W/N TEST COMP: CPT

## 2023-05-08 PROCEDURE — 95911 NRV CNDJ TEST 9-10 STUDIES: CPT

## 2023-05-09 ENCOUNTER — TELEPHONE (OUTPATIENT)
Dept: INTERNAL MEDICINE CLINIC | Facility: CLINIC | Age: 63
End: 2023-05-09

## 2023-05-09 DIAGNOSIS — R20.2 PARESTHESIA OF HAND, BILATERAL: Primary | ICD-10-CM

## 2023-05-09 NOTE — TELEPHONE ENCOUNTER
Spoke with patient using  via language line. Patient states she is still having weakness in her wrist and fingers. States she is dropping things and cannot hold on to anything. Patient states she will look for wrist splint and asks if she can go ahead with occupational therapy. Order pended.  To Dr. Carrington Horton

## 2023-05-09 NOTE — TELEPHONE ENCOUNTER
----- Message from Ayan Bravo MD sent at 5/9/2023  9:12 AM CDT -----  EMG/nerve conduction study is normal.    It is not showing any evidence of carpal tunnel syndrome. If patient is not noting improvement with soft wrist splint, I would recommend that we have patient see occupational therapy which can help with continued hand pain.

## 2023-07-06 RX ORDER — ESCITALOPRAM OXALATE 10 MG/1
TABLET ORAL
Qty: 30 TABLET | Refills: 6 | OUTPATIENT
Start: 2023-07-06

## 2023-07-06 RX ORDER — ESCITALOPRAM OXALATE 10 MG/1
TABLET ORAL
Qty: 90 TABLET | Refills: 3 | Status: SHIPPED | OUTPATIENT
Start: 2023-07-06

## 2023-07-06 NOTE — TELEPHONE ENCOUNTER
Refill request is for a maintenance medication and has met the criteria specified in the Ambulatory Medication Refill Standing Order for eligibility, visits, laboratory, alerts and was sent to the requested pharmacy.     Requested Prescriptions     Signed Prescriptions Disp Refills    ESCITALOPRAM 10 MG Oral Tab 90 tablet 3     Sig: TAKE 1 TABLET(10 MG) BY MOUTH DAILY     Authorizing Provider: Keenan Barcenas     Ordering User: Amarjit Barnett

## 2023-07-06 NOTE — TELEPHONE ENCOUNTER
Duplicate  Current refill request refused due to refill is either a duplicate request or has active refills at the pharmacy. Check previous templates.     Requested Prescriptions     Refused Prescriptions Disp Refills    ESCITALOPRAM 10 MG Oral Tab [Pharmacy Med Name: ESCITALOPRAM 10MG TABLETS] 30 tablet 6     Sig: TAKE 1 TABLET(10 MG) BY MOUTH DAILY     Refused By: Thea Cook     Reason for Refusal: Duplicate refill request

## 2023-07-07 RX ORDER — BLOOD SUGAR DIAGNOSTIC
STRIP MISCELLANEOUS
Qty: 200 STRIP | Refills: 3 | Status: SHIPPED | OUTPATIENT
Start: 2023-07-07

## 2023-07-07 NOTE — TELEPHONE ENCOUNTER
Refill request is for a maintenance medication and has met the criteria specified in the Ambulatory Medication Refill Standing Order for eligibility, visits, laboratory, alerts and was sent to the requested pharmacy.     Requested Prescriptions     Signed Prescriptions Disp Refills    Glucose Blood (ONETOUCH ULTRA) In Vitro Strip 200 strip 3     Sig: TEST TWICE DAILY AS DIRECTED     Authorizing Provider: Nellie Alejo     Ordering User: Ebonie Bull

## 2023-07-19 ENCOUNTER — OFFICE VISIT (OUTPATIENT)
Dept: INTERNAL MEDICINE CLINIC | Facility: CLINIC | Age: 63
End: 2023-07-19

## 2023-07-19 VITALS
SYSTOLIC BLOOD PRESSURE: 114 MMHG | BODY MASS INDEX: 35.24 KG/M2 | HEIGHT: 65 IN | HEART RATE: 84 BPM | OXYGEN SATURATION: 97 % | WEIGHT: 211.5 LBS | DIASTOLIC BLOOD PRESSURE: 76 MMHG

## 2023-07-19 DIAGNOSIS — E78.5 HYPERLIPIDEMIA, UNSPECIFIED HYPERLIPIDEMIA TYPE: ICD-10-CM

## 2023-07-19 DIAGNOSIS — Z12.9 CANCER SCREENING: ICD-10-CM

## 2023-07-19 DIAGNOSIS — E11.9 DIABETES MELLITUS TYPE 2 WITHOUT RETINOPATHY (HCC): ICD-10-CM

## 2023-07-19 DIAGNOSIS — F32.A DEPRESSION, UNSPECIFIED DEPRESSION TYPE: ICD-10-CM

## 2023-07-19 DIAGNOSIS — I10 ESSENTIAL HYPERTENSION: ICD-10-CM

## 2023-07-19 DIAGNOSIS — M79.642 BILATERAL HAND PAIN: Primary | ICD-10-CM

## 2023-07-19 DIAGNOSIS — M79.641 BILATERAL HAND PAIN: Primary | ICD-10-CM

## 2023-07-19 LAB
CARTRIDGE LOT#: ABNORMAL NUMERIC
HEMOGLOBIN A1C: 7.9 % (ref 4.3–5.6)

## 2023-07-19 PROCEDURE — 3008F BODY MASS INDEX DOCD: CPT | Performed by: INTERNAL MEDICINE

## 2023-07-19 PROCEDURE — 99213 OFFICE O/P EST LOW 20 MIN: CPT | Performed by: INTERNAL MEDICINE

## 2023-07-19 PROCEDURE — 3074F SYST BP LT 130 MM HG: CPT | Performed by: INTERNAL MEDICINE

## 2023-07-19 PROCEDURE — 3051F HG A1C>EQUAL 7.0%<8.0%: CPT | Performed by: INTERNAL MEDICINE

## 2023-07-19 PROCEDURE — 3078F DIAST BP <80 MM HG: CPT | Performed by: INTERNAL MEDICINE

## 2023-07-19 PROCEDURE — 83036 HEMOGLOBIN GLYCOSYLATED A1C: CPT | Performed by: INTERNAL MEDICINE

## 2023-07-20 ENCOUNTER — TELEPHONE (OUTPATIENT)
Dept: INTERNAL MEDICINE CLINIC | Facility: CLINIC | Age: 63
End: 2023-07-20

## 2023-07-20 NOTE — TELEPHONE ENCOUNTER
[employer] requesting Prior Authorization for:  [employer] patients plan does not cover the prescribed drug without a prior authorization.   Please call [phone], patient IG#[id number]\"    Fax placed in purple folder    Tasked to RX

## 2023-07-24 NOTE — TELEPHONE ENCOUNTER
[facility] [employer]  Non-Preferred PA required [phone]    Duplicate request

## 2023-07-25 ENCOUNTER — PATIENT MESSAGE (OUTPATIENT)
Dept: INTERNAL MEDICINE CLINIC | Facility: CLINIC | Age: 63
End: 2023-07-25

## 2023-07-25 NOTE — TELEPHONE ENCOUNTER
From: Rosette Raymond  To: Raine Salgado MD  Sent: 7/25/2023 12:12 PM CDT  Subject: Swollen tonsil causing cough    Hi doctor, my right tonsil is very swollen. Would you be able to send me a medication for it or set up an appointment to see it?

## 2023-07-31 ENCOUNTER — OFFICE VISIT (OUTPATIENT)
Dept: ORTHOPEDICS CLINIC | Facility: CLINIC | Age: 63
End: 2023-07-31

## 2023-07-31 ENCOUNTER — HOSPITAL ENCOUNTER (OUTPATIENT)
Dept: GENERAL RADIOLOGY | Facility: HOSPITAL | Age: 63
Discharge: HOME | End: 2023-07-31
Attending: ORTHOPAEDIC SURGERY
Payer: MEDICAID

## 2023-07-31 DIAGNOSIS — M65.322 TRIGGER FINGER, LEFT INDEX FINGER: Primary | ICD-10-CM

## 2023-07-31 DIAGNOSIS — M79.643 PAIN OF HAND, UNSPECIFIED LATERALITY: ICD-10-CM

## 2023-07-31 DIAGNOSIS — M65.312 TRIGGER FINGER OF LEFT THUMB: ICD-10-CM

## 2023-07-31 PROCEDURE — 73130 X-RAY EXAM OF HAND: CPT | Performed by: ORTHOPAEDIC SURGERY

## 2023-07-31 NOTE — H&P
NURSING INTAKE COMMENTS: Patient presents with:  Hand Pain: Bilateral  hand Consult -onset on pain 6 month ago- pain 7-8/10  mainly Left on thumb and 2 finger is swollen , pain is worse after ROM. Language line use ID #[id number] Josie Arvizu      HPI: This 66year old right-hand-dominant female presents today bilateral trigger digits left hand much worse than right. Because of the left hand today. It involves the first and second fingers bilaterally. She denies numbness or tingling. She has diabetes. She does not allow them to flex at all so it is difficult to say if there is dot locking. Language line provided translation. Her medical history is significant for diabetes. She lives independently with her family. Past Medical History:   Diagnosis Date    Benign essential HTN     Diabetes ([address]     High blood pressure     Hypothyroidism      Past Surgical History:   Procedure Laterality Date          COLONOSCOPY N/A 2021     colonoscopy done --lipoma only--needs f/u colonpscopy 10 years    REMOVAL GALLBLADDER       Current Outpatient Medications   Medication Sig Dispense Refill    semaglutide 2 MG/3ML Subcutaneous Solution Pen-injector Inject 0.25 mg into the skin once a week. 4 each 3    Glucose Blood (ONETOUCH ULTRA) In Vitro Strip TEST TWICE DAILY AS DIRECTED 200 strip 3    metFORMIN HCl 1000 MG Oral Tab Take 1 tablet (1,000 mg total) by mouth 2 (two) times daily with meals. 60 tablet 11    ROSUVASTATIN 10 MG Oral Tab TAKE 1 TABLET(10 MG) BY MOUTH EVERY NIGHT 90 tablet 3    JARDIANCE 25 MG Oral Tab TAKE 1 TABLET BY MOUTH DAILY 30 tablet 11    triamcinolone 0.1 % External Cream Apply topically 2 (two) times daily.  60 g 3    HYDROCHLOROTHIAZIDE 25 MG Oral Tab TAKE 1 TABLET(25 MG) BY MOUTH DAILY 90 tablet 3    LEVOTHYROXINE 75 MCG Oral Tab TAKE 1 TABLET(75 MCG) BY MOUTH BEFORE BREAKFAST 90 tablet 3    MELOXICAM 15 MG Oral Tab TAKE 1 TABLET(15 MG) BY MOUTH DAILY 90 tablet 3    PIOGLITAZONE 45 MG Oral Tab TAKE 1 TABLET(45 MG) BY MOUTH DAILY 90 tablet 3    LOSARTAN 50 MG Oral Tab TAKE 1 TABLET(50 MG) BY MOUTH DAILY 90 tablet 3    Multiple Vitamins-Minerals (WOMENS ONE DAILY) Oral Tab Take by mouth. No Known Allergies  Family History   Problem Relation Age of Onset    Other (cad) Mother     Diabetes Father     Diabetes Sister     Diabetes Brother     Glaucoma Neg     Macular degeneration Neg      No family Hx of DVT/PE    Social History    Occupational History      Not on file    Tobacco Use      Smoking status: Never      Smokeless tobacco: Never    Vaping Use      Vaping Use: Never used    Substance and Sexual Activity      Alcohol use: Never      Drug use: Never      Sexual activity: Not on file       Review of Systems:  GENERAL: feels generally well, no significant weight loss or weight gain  SKIN: no ulcerated or worrisome skin lesions  EYES:denies blurred vision or double vision  HEENT: denies new nasal congestion, sinus pain or ST  LUNGS: denies shortness of breath  CARDIOVASCULAR: denies chest pain  GI: no hematemesis, no worsening heartburn, no diarrhea  : no dysuria, no blood in urine, no difficulty urinating, no incontinence  MUSCULOSKELETAL: no other musculoskeletal complaints other than in HPI  NEURO: no numbness or tingling, no weakness or balance disorder  PSYCHE: no depression or anxiety  HEMATOLOGIC: no hx of blood dyscrasia, no Hx DVT/PE  ENDOCRINE: no thyroid or diabetes issues  ALL/ASTHMA: no new hx of severe allergy or asthma    Physical Examination:    There were no vitals taken for this visit. Constitutional: appears well hydrated, alert and responsive, no acute distress noted  Extremities: The hands did not show swelling despite her saying they were swollen. The skin was healthy. Musculoskeletal: She was focally tender predominantly in the A1 pulley of the left second finger and left thumb. The right were less so.   Forcibly flexing these digits causes palpable popping consistent with trigger digit. It also caused her pain. Neurological: Normal motor and sensory left hand and fingers. The tendons are intact. Imaging: X-ray of the hands were nearly normal.  Very mild degenerative changes diffusely but no acute findings to explain her symptoms. No results found. Lab Results   Component Value Date    WBC 10.4 04/14/2022    HGB 13.4 04/14/2022    .0 04/14/2022      Lab Results   Component Value Date     (H) 12/01/2022    BUN 23 (H) 12/01/2022    CREATSERUM 0.74 12/01/2022    GFRNAA 97 04/14/2022    GFRAA 111 04/14/2022        Assessment and Plan:  Diagnoses and all orders for this visit:    Trigger finger, left index finger    Pain of hand, unspecified laterality  -     Cancel: XR HAND (MIN 3 VIEWS), LEFT (CPT=73130); Future  -     Cancel: XR HAND (MIN 3 VIEWS), RIGHT (CPT=73130); Future    Trigger finger of left thumb        Assessment: Triggering digit left and right fingers 1 and 2. Plan: I offered cortisone injections for left thumb and index fingers but she declined. We talked about surgery and she declined this as well. She said she probably will discuss this with her family and will probably come to another appointment at some point for injections. I let her know that injections work about 50% of the time. For now we will see her as needed. Follow Up: No follow-ups on file.     Rafa Yuen MD

## 2023-08-07 ENCOUNTER — OFFICE VISIT (OUTPATIENT)
Dept: OCCUPATIONAL MEDICINE | Facility: HOSPITAL | Age: 63
End: 2023-08-07
Attending: INTERNAL MEDICINE
Payer: MEDICAID

## 2023-08-07 DIAGNOSIS — M79.642 BILATERAL HAND PAIN: Primary | ICD-10-CM

## 2023-08-07 DIAGNOSIS — M79.641 BILATERAL HAND PAIN: Primary | ICD-10-CM

## 2023-08-07 PROCEDURE — 97165 OT EVAL LOW COMPLEX 30 MIN: CPT

## 2023-08-07 PROCEDURE — 97110 THERAPEUTIC EXERCISES: CPT

## 2023-08-07 NOTE — PROGRESS NOTES
OCCUPATIONAL THERAPY UPPER EXTREMITY EVALUATION:   Referring Physician: Dr. Genet Thrasher Date of onset: ~ 3 month  Diagnosis: Bilateral hand pain (C67.722,U66.302)  Date of Service: 8/7/2023  Date of Surgery: n/a     PATIENT SUMMARY:   Norma is a 65 y/o female who presents to therapy today with complaints of pain in bilateral hands. Pt describes pain level 7/10, L>R primarily in the dorsum of hands. History of current condition: Pt reports having bilateral hand pain occurring in April of 2023. Pt unaware what caused it however it has since gotten worse. Pt reports having surgery to her R hand for a cist removal years ago. Current functional limitations include inability to close R D2, difficulty cooking, difficulty with gripping and holding items, difficulty closing bra. : ID [id number]    Pt describes prior level of function as independent in all adls and iadls . Employment: Unemployed  Hand Dominance: right  Living Situation: w/ spouse  Imaging/Tests:   EMG Conclusion: This is a normal study. There is no electrodiagnostic evidence of a median nerve entrapment neuropathy, a diffuse UER large fibre polyneuropathy or a R cervical radiculopathy at this time. Pt goals include regain strength for functional gripping tasks and reduce pain. Past medical history was reviewed with pt. Significant findings include  has a past medical history of Benign essential HTN, Diabetes ([address] High blood pressure, and Hypothyroidism. ASSESSMENT:   Given the above noted deficits in pain, coordination, and strength, patient presents with impairments in occupation- based task performance for self care skills, and leisure skills. Pt's bilateral hand  and pinch strength are extremely low. Her scores do not even reach the 10% percentile of her age group. Bilateral hand weakness is her greatest barrier towards functional independence at this time.  Pt would benefit from continued skilled OT to address the subcomponents of strength and motor control to allow her the ability to regain above- noted skills. In agreement with QuickDASH score and clinical rationale this evaluation involved Low complexity decision making due to 1-2 performance deficits, as well as minimal to moderate modifications of tasks or assistance. Also involves a brief review of occupational profile and medical and therapy history. Precautions: None        OBJECTIVE:   OBSERVATION: Scar noted on R hand, mild nodule noted in the A1 pulley of the R D3.    ORTHOTICS: none currently    SCAR:  R hand 1st dorsal compartment surgical incision scarring, non adhesive       SENSORY: WNL    AROM: shoulder, elbow, wrist WFL      DIGIT ROM:    RIGHT HAND AROM:  Opposition: unable to touch Ring finger    LEFT HAND AROM: WFL        Strength (lbs) Right            Trial          1           2            3          Average Left               Trial           1           2            3          Average   : 6 6 4 5    8 8 8 8      2 pt Pinch: 0.5 1 1 0.8    2 2 2 2      3 pt Pinch: 0 1 1 0.6    1 2 1 1.3      Lateral Pinch: 2 2 2 2    3 3 3 3          SPECIAL TESTS: 9 hole peg test: R- 1:05, L-52 seconds    Today's Treatment: Patient education provided on joint blocking exercises to PIP and DIP of bilateral hands. Pt was instructed in and issued a HEP for: exercises not issued yet will next session.   Date 8/7/2023                  Visit #                                     Evaluation X                Manual                                                                             Ther ex                   Joint Blocking 10x2                                                                                                                                       HEP instruction                                        Therapeutic Activity                                       Neuromuscular Re-education Modalities                                                             Charges:  Low OT Eval x1, TE 1      Total Timed Treatment: 10 min     Total Treatment Time: 45 min       PLAN OF CARE:   Goals:    Pt complaints of bilateral hand pain will decrease at worst to 1/10. Pt will be independent and compliant with comprehensive HEP to maintain progress achieved in OT. Pt will increase her B  strength to at least 10# for ease of carrying groceries  Pt will increase 2 and 3 point pinch to at least 5# for ease of opening jars  Pt will decrease her Bilateral hand 9-hole peg score by 10 secs for ease of dressing. Frequency / Duration: Patient will be seen for 1-2 x/week or a total of 12 visits over a 90 day period. Treatment will include: Manual Therapy, Soft tissue mobilization, AROM, PROM, Strengthening, Therapeutic Activity, Moist heat, cryotherapy, Ultrasound, Whirlpool (fluidotherapy), Paraffin, Electrical Stim, Orthosis,  Neuromuscular Re-education, Patient education, Home exercise program, joint protection techniques. Education or treatment limitation: Communication, Amharic speaking requires   Rehab Potential:excellent       Patient was advised of these findings, precautions, and treatment options and has agreed to actively participate in planning and for this course of care. Thank you for your referral. Please co-sign or sign and return this letter via fax as soon as possible to [phone]. If you have any questions, please contact me at Dept: [phone]    Sincerely,  Electronically signed by therapist: Rufus Prabhakar MS, OTR/L    [de-identified] certification required: Yes  I certify the need for these services furnished under this plan of treatment and while under my care.         X______________________________________________ Date________________  Certification From: 5/0/0980 To: 10/06/23

## 2023-08-08 NOTE — TELEPHONE ENCOUNTER
Prior Authorization Request Form received from [address] [place] [address] Dr Murrell: Ozempic (0.25 or 0.5 mg/dose)    Placed in purpled folder

## 2023-08-08 NOTE — TELEPHONE ENCOUNTER
ePA submitted     Waiting for Payer Response   8/8/2023 12:14 PM  Case ID: [id number]

## 2023-08-09 NOTE — TELEPHONE ENCOUNTER
Denied   8/8/2023  9:00 PM  Case ID: [id number] Appeal supported: No  Note from payer: Details of this decision are provided on the physician outcome notice which has been faxed to the number on file.

## 2023-08-10 ENCOUNTER — OFFICE VISIT (OUTPATIENT)
Dept: OCCUPATIONAL MEDICINE | Facility: HOSPITAL | Age: 63
End: 2023-08-10
Attending: INTERNAL MEDICINE
Payer: MEDICAID

## 2023-08-10 PROCEDURE — 97110 THERAPEUTIC EXERCISES: CPT

## 2023-08-10 RX ORDER — DULAGLUTIDE 0.75 MG/.5ML
0.75 INJECTION, SOLUTION SUBCUTANEOUS WEEKLY
Qty: 2 ML | Refills: 1 | Status: SHIPPED | OUTPATIENT
Start: 2023-08-10

## 2023-08-10 NOTE — PROGRESS NOTES
Diagnosis:   Bilateral hand pain (C05.681,B84.592)        Referring Provider: Kirsten Ramsey  Date of Evaluation:    08/07/23    Precautions:  None Next MD visit:   none scheduled  Date of Surgery: n/a   Insurance Primary/Secondary: BLUE CROSS MEDICAID / N/A     # Auth Visits: 13    Turkmen ID: [id number]           Subjective: Pt reports dropping board on her L hand resulting in bruising. Pain: present did not rate      Objective: See chart below for objective data and exercises/activities performed       Assessment: OT observed bruising throughout L hand specifically over phalanges. OT will continue to monitor in following sessions. OT trained and demo'd tendon glides and joint blocking exercises to patient, pt returned demo with mod v/c until able to display independence. L D2 flexion lag however with exercises increase observed ROM noted. Goals:   Pt complaints of bilateral hand pain will decrease at worst to 1/10. Pt will be independent and compliant with comprehensive HEP to maintain progress achieved in OT. Pt will increase her B  strength to at least 10# for ease of carrying groceries  Pt will increase 2 and 3 point pinch to at least 5# for ease of opening jars  Pt will decrease her Bilateral hand 9-hole peg score by 10 secs for ease of dressing. Plan: Follow up on HEP  Date: 8/10/2023  TX#: 2/13 Date:                 TX#: 3/ Date:                 TX#: 4/ Date:                 TX#: 5/ Date:    Tx#: 6/   Digital AROM x20       Tendon Glides x20       MCP and PIP joint blocking for activation of FDS and FDP              HEP:         Charges: TE 3       Total Timed Treatment: 40 min  Total Treatment Time: 40 min

## 2023-08-14 ENCOUNTER — OFFICE VISIT (OUTPATIENT)
Dept: OCCUPATIONAL MEDICINE | Facility: HOSPITAL | Age: 63
End: 2023-08-14
Attending: INTERNAL MEDICINE
Payer: MEDICAID

## 2023-08-14 PROCEDURE — 97110 THERAPEUTIC EXERCISES: CPT

## 2023-08-14 NOTE — PROGRESS NOTES
Diagnosis:   Bilateral hand pain (H44.268,J03.594)        Referring Provider: Renetta Fernandez  Date of Evaluation:    08/07/23    Precautions:  None Next MD visit:   none scheduled  Date of Surgery: n/a   Insurance Primary/Secondary: BLUE CROSS MEDICAID / N/A     # Auth Visits: 13    Tajik ID: Toro Sherwood [id number]          Subjective: Pt states she is compliant with her exercises, however, does not feel like hands are better. Pain: 6/10 R and L hand      Objective: See chart below for objective data and exercises/activities performed       Assessment: Pt continues to report bilateral hand pain with AROM/PROM. Pt does not demo any hard end feel with her fingers. All joints are mobile but pain is biggest barrier towards function. Today's session focused on L hand specifically L D2.    Goals:   Pt complaints of bilateral hand pain will decrease at worst to 1/10. Pt will be independent and compliant with comprehensive HEP to maintain progress achieved in OT. Pt will increase her B  strength to at least 10# for ease of carrying groceries  Pt will increase 2 and 3 point pinch to at least 5# for ease of opening jars  Pt will decrease her Bilateral hand 9-hole peg score by 10 secs for ease of dressing. Plan: continue joint blocking and tendon glides for increased composite digital flexion in B hands   Date: 8/10/2023  TX#: 2/13 Date: 8/14/2023         TX#: 3/13 Date:                 TX#: 4/ Date:                 TX#: 5/ Date:    Tx#: 6/   Digital AROM x20 Digital AROM x20      Tendon Glides x20 Tendon Glides x20      MCP and PIP joint blocking for activation of FDS and FDP MCP and PIP joint blocking for activation of FDS and FDP 10x2       Digit<>palm translation with multiple sized chips      HEP: none added today        Charges: TE 3       Total Timed Treatment: 40 min  Total Treatment Time: 40 min

## 2023-08-15 ENCOUNTER — TELEPHONE (OUTPATIENT)
Dept: INTERNAL MEDICINE CLINIC | Facility: CLINIC | Age: 63
End: 2023-08-15

## 2023-08-15 NOTE — TELEPHONE ENCOUNTER
Spoke with pt using  services [employer]/ ID  [id number]. Relayed MD message below. Pt states she needs something for cough. It is bad at nighttime. Reports coughing is painful, 5/10. Pt state her tongue on the right side is swollen and that is causing the cough. Pt denies SOB, Chest Pain, wheezing. Denies tingling, redness, lip/throat swelling, weakness, fever, chills. Pt denies any OTC medications. She is tolerating tea. Pt requesting an appointment for today, Pt advised no available appts. Advised to be evaluated in UC for swelling tongue. Pt declines and requests medication for cough. Advised patient their symptoms/message will be passed on to their doctor for review. Advised that patient present to the ER if these symptoms worsen.

## 2023-08-15 NOTE — TELEPHONE ENCOUNTER
If patient is noting tongue swelling, cough syrup may not be the answer  I definitely would recommend evaluation in urgent care today. If she wants something for cough, would recommend just over-the-counter Robitussin-DM.   But as per my previous note, she should definitely use the Zyrtec and the Flonase

## 2023-08-15 NOTE — TELEPHONE ENCOUNTER
Spoke with patient states she has been having dry cough and sore throat for 2 weeks. Patient denies fever, chills, nasal drainage or congestion, sob, wheezing, chest pain, headache, weakness, sinus pain or pressure. Patient has not taken any medication over the counter. Patient hoping to make appointment with Dr. Jennifre Ba. Advised covid test. Patient will call back with results.       [address]

## 2023-08-15 NOTE — TELEPHONE ENCOUNTER
Symptoms are most consistent with seasonal/environmental allergies. Would recommend trial of nonsedating antihistamine with over-the-counter Zyrtec 10 mg daily and Flonase nasal spray 2 sprays each nostril daily. If there is no improvement in symptoms over the next 7 to 10 days, then patient needs follow-up office visit.

## 2023-08-15 NOTE — TELEPHONE ENCOUNTER
Spoke with pt using  service ID  [id number]. Spoke with patient. Relayed MD message. Pt verbalized understanding. Right tonsil is swollen. Coughing is worse when laying down. Now has back pain from coughing. Advised UC -- pt refusing. Requesting office visit for eval -- scheduled with an assoc. for tomorrow 8/16. Patient verbalizes understanding that noncompliance with the medical recommendations provided could result in increased risk of morbidity or even mortality.

## 2023-08-15 NOTE — TELEPHONE ENCOUNTER
Pt called to schedule an appt with Dr Hazel Moreno for a cough & sore throat   Pt has had them for about 2 weeks, she has not tested for Covid    Please call to triage for appt [phone]     Pt speaks a little english/mostly Yakut   Grandson helped with call - he will be with patient today until 3:30 pm

## 2023-08-16 ENCOUNTER — OFFICE VISIT (OUTPATIENT)
Dept: INTERNAL MEDICINE CLINIC | Facility: CLINIC | Age: 63
End: 2023-08-16

## 2023-08-16 ENCOUNTER — OFFICE VISIT (OUTPATIENT)
Dept: OCCUPATIONAL MEDICINE | Facility: HOSPITAL | Age: 63
End: 2023-08-16
Attending: INTERNAL MEDICINE
Payer: MEDICAID

## 2023-08-16 VITALS
HEIGHT: 65 IN | SYSTOLIC BLOOD PRESSURE: 122 MMHG | BODY MASS INDEX: 34.99 KG/M2 | DIASTOLIC BLOOD PRESSURE: 74 MMHG | OXYGEN SATURATION: 97 % | TEMPERATURE: 98 F | WEIGHT: 210 LBS | HEART RATE: 79 BPM

## 2023-08-16 DIAGNOSIS — R05.9 COUGH, UNSPECIFIED TYPE: Primary | ICD-10-CM

## 2023-08-16 DIAGNOSIS — E03.9 HYPOTHYROIDISM, UNSPECIFIED TYPE: ICD-10-CM

## 2023-08-16 DIAGNOSIS — I10 ESSENTIAL HYPERTENSION: ICD-10-CM

## 2023-08-16 DIAGNOSIS — E11.9 DIABETES MELLITUS TYPE 2 WITHOUT RETINOPATHY (HCC): ICD-10-CM

## 2023-08-16 PROCEDURE — 97110 THERAPEUTIC EXERCISES: CPT

## 2023-08-16 PROCEDURE — 99214 OFFICE O/P EST MOD 30 MIN: CPT | Performed by: INTERNAL MEDICINE

## 2023-08-16 PROCEDURE — 3078F DIAST BP <80 MM HG: CPT | Performed by: INTERNAL MEDICINE

## 2023-08-16 PROCEDURE — 3008F BODY MASS INDEX DOCD: CPT | Performed by: INTERNAL MEDICINE

## 2023-08-16 PROCEDURE — 3074F SYST BP LT 130 MM HG: CPT | Performed by: INTERNAL MEDICINE

## 2023-08-16 RX ORDER — PANTOPRAZOLE SODIUM 40 MG/1
40 TABLET, DELAYED RELEASE ORAL
Qty: 30 TABLET | Refills: 3 | Status: SHIPPED | OUTPATIENT
Start: 2023-08-16

## 2023-08-16 NOTE — PROGRESS NOTES
Diagnosis:   Bilateral hand pain (I97.170,P54.122)        Referring Provider: Katerina Granaods  Date of Evaluation:    08/07/23    Precautions:  None Next MD visit:   none scheduled  Date of Surgery: n/a   Insurance Primary/Secondary: BLUE CROSS MEDICAID / N/A     # Auth Visits: 13 but only 12 requested   Thai ID: phone  ID not obtained        Subjective: Pt reports pain in her L D2 with no explanation of reasoning for the pain, no increase in activity reported. Pain: 5/10 L D2      Objective: See chart below for objective data and exercises/activities performed       Assessment: Although pt was able to complete exercises with no facial or verbal grimacing, pt reports pain with functional activities. Pt guarded with movement today. Goals:   Pt complaints of bilateral hand pain will decrease at worst to 1/10. Pt will be independent and compliant with comprehensive HEP to maintain progress achieved in OT. Pt will increase her B  strength to at least 10# for ease of carrying groceries  Pt will increase 2 and 3 point pinch to at least 5# for ease of opening jars  Pt will decrease her Bilateral hand 9-hole peg score by 10 secs for ease of dressing. Plan: Continue functional manipulation and grasping activities   Date: 8/10/2023  TX#: 2/12 Date: 8/14/2023         TX#: 3/12 Date: 8/16/2023             TX#: 4/12 Date:                 TX#: 5/ Date:    Tx#: 6/   Digital AROM x20 Digital AROM x20 Digital AROM x20     Tendon Glides x20 Tendon Glides x20 Tendon Glides x20     MCP and PIP joint blocking for activation of FDS and FDP MCP and PIP joint blocking for activation of FDS and FDP 10x2       Digit<>palm translation with multiple sized chips        Large peg manipulation task ~15 mins       Yellow Web: 10x2   - and digital extension     HEP: none added today        Charges: TE 3       Total Timed Treatment: 42 min  Total Treatment Time: 42 min

## 2023-08-21 ENCOUNTER — OFFICE VISIT (OUTPATIENT)
Dept: OCCUPATIONAL MEDICINE | Facility: HOSPITAL | Age: 63
End: 2023-08-21
Attending: INTERNAL MEDICINE
Payer: MEDICAID

## 2023-08-21 ENCOUNTER — TELEPHONE (OUTPATIENT)
Dept: OCCUPATIONAL MEDICINE | Facility: HOSPITAL | Age: 63
End: 2023-08-21

## 2023-08-21 PROCEDURE — 97110 THERAPEUTIC EXERCISES: CPT

## 2023-08-21 NOTE — PROGRESS NOTES
Diagnosis:   Bilateral hand pain (D11.096,X05.817)        Referring Provider: Pieter Anglin  Date of Evaluation:    08/07/23    Precautions:  None Next MD visit:   none scheduled  Date of Surgery: n/a   Insurance Primary/Secondary: BLUE CROSS MEDICAID / N/A     # Auth Visits: 13 but only 12 requested   Czech ID: [id number]    Subjective:  Pt arrived 10 mins late to session. Pt states her hands are feeling a little better specifically with movement not the pain. Pain: 5/10       Objective: See chart below for objective data and exercises/activities performed. All exercises done bilaterally      Assessment: Pt had the most difficulty with opposing L thumb to D2, verbalizing pain. Pt challenged today in pinch strengthening of her bilateral hand. Pt had difficulty with L 2 point with lowest level of resistance pin therefore L hand performed in 3 point. Pain continues to be greatest barrier towards pace of progression. Goals:   Pt complaints of bilateral hand pain will decrease at worst to 1/10. Pt will be independent and compliant with comprehensive HEP to maintain progress achieved in OT. Pt will increase her B  strength to at least 10# for ease of carrying groceries  Pt will increase 2 and 3 point pinch to at least 5# for ease of opening jars  Pt will decrease her Bilateral hand 9-hole peg score by 10 secs for ease of dressing. Plan: Progress note next session, possibly added rubber band exercises to home as tolerated   Date: 8/10/2023  TX#: 2/12 Date: 8/14/2023         TX#: 3/12 Date: 8/16/2023             TX#: 4/12 Date: 8/21/2023             TX#: 5/12 Date:    Tx#: 6/   Digital AROM x20 Digital AROM x20 Digital AROM x20 Digital AROM x20    Tendon Glides x20 Tendon Glides x20 Tendon Glides x20        Opposition 10x B hand    MCP and PIP joint blocking for activation of FDS and FDP MCP and PIP joint blocking for activation of FDS and FDP 10x2 MCP and PIP joint blocking for activation of FDS and FDP 10x2 MCP and PIP joint blocking for activation of FDS and FDP 10x2     Digit<>palm translation with multiple sized chips        Large peg manipulation task ~15 mins        Yellow Resistance pin transfers of cotton balls      Yellow Web: 10x2   - and digital extension Yellow Web: 10x2     HEP: none added today      Charges: TE 2       Total Timed Treatment: 30 min  Total Treatment Time: 30 min

## 2023-08-23 ENCOUNTER — OFFICE VISIT (OUTPATIENT)
Dept: OCCUPATIONAL MEDICINE | Facility: HOSPITAL | Age: 63
End: 2023-08-23
Attending: INTERNAL MEDICINE
Payer: MEDICAID

## 2023-08-23 PROCEDURE — 97110 THERAPEUTIC EXERCISES: CPT

## 2023-08-23 NOTE — PROGRESS NOTES
Diagnosis:   Bilateral hand pain (G23.748,K71.631)        Referring Provider: Lela Mejias  Date of Evaluation:    08/07/23    Precautions:  None Next MD visit:   none scheduled  Date of Surgery: n/a   Insurance Primary/Secondary: BLUE CROSS MEDICAID / N/A     # Auth Visits: 13 but only 12 requested   Carlos Katty Mccullough, [id number], [id number]      Discharge Summary  Pt has attended 6, cancelled 0, and no shown 0 visits in Occupational Therapy. Subjective: Pt reports feeling min to no improvements. Pt states her L hand hurts constantly. Assessment: Pt has been seen for 6 skilled OT sessions in which she has worked to regain her mobility and strength in her bilateral hands. Although pt has verbalized compliance with HEP and actively participates in OT treatment sessions, pt has made minimal gains in therapy. At this time pt has met 1/5 goals and minimally progressed in the others and/or maintained initial eval scores. Pt's pain continues to be a barrier towards greater success in therapy and pt reports not feeling as though therapy is helping. Pt states her L hand seems to be getting worse. Due to minimal progress and pt's reports of minimal relief from therapy, OT advised pt to return to doctor for further testing at this time. Pt and OT agreed upon discharge at this time. Objective:   Strength (lbs) Left  Right   : 5.8 5.0   2 pt Pinch: 1 2   3 pt Pinch: 2 2   Lateral Pinch: 3 2         SPECIAL TESTS: 9 hole peg test: R EVAL- 1:05 and TODAY: 47 seconds, L EVAL-52 seconds and TODAY 46 seconds    Goals:   Pt complaints of bilateral hand pain will decrease at worst to 1/10.-NOT MET  Pt will be independent and compliant with comprehensive HEP to maintain progress achieved in OT. -MET  Pt will increase her B  strength to at least 10# for ease of carrying groceries-NOT MET  Pt will increase 2 and 3 point pinch to at least 5# for ease of opening jars-NOT MET  Pt will decrease her Bilateral hand 9-hole peg score by 10 secs for ease of dressing. -NOT MET        Plan: Discharge pt      Patient was advised of these findings, precautions, and discharge plan    Thank you for your referral. If you have any questions, please contact me at Dept: [phone]. Sincerely,  Electronically signed by therapist: Ta Williamson MS, OTR/L    Physician's certification required: No          Treatment Subjective: Pt states no relief of pain from exercises. Pain: 7/10 L>R       Objective: See chart below for objective data and exercises/activities performed. All exercises done bilaterally      Assessment: Pt demo'd independence of HEP to therapist.    Goals:   Pt complaints of bilateral hand pain will decrease at worst to 1/10.-PROGRESSING  Pt will be independent and compliant with comprehensive HEP to maintain progress achieved in OT.-MET, continuous   Pt will increase her B  strength to at least 10# for ease of carrying groceries  Pt will increase 2 and 3 point pinch to at least 5# for ease of opening jars  Pt will decrease her Bilateral hand 9-hole peg score by 10 secs for ease of dressing.      Plan: Discharge pt  Date: 8/10/2023  TX#: 2/12 Date: 8/14/2023         TX#: 3/12 Date: 8/16/2023             TX#: 4/12 Date: 8/21/2023             TX#: 5/12 Date: 8/24/23  Tx#: 6/12       STM to bilateral hands   Digital AROM x20 Digital AROM x20 Digital AROM x20 Digital AROM x20 Digital AROM x20   Tendon Glides x20 Tendon Glides x20 Tendon Glides x20        Opposition 10x B hand    MCP and PIP joint blocking for activation of FDS and FDP MCP and PIP joint blocking for activation of FDS and FDP 10x2 MCP and PIP joint blocking for activation of FDS and FDP 10x2 MCP and PIP joint blocking for activation of FDS and FDP 10x2 MCP and PIP joint blocking for activation of FDS and FDP 10x3    Digit<>palm translation with multiple sized chips        Large peg manipulation task ~15 mins        Yellow Resistance pin transfers of cotton balls Yellow Web: 10x2   - and digital extension Yellow Web: 10x2  Yellow Web: 10x2    HEP: none added today      Charges: TE 3       Total Timed Treatment: 40 min  Total Treatment Time: 40 min

## 2023-08-29 ENCOUNTER — APPOINTMENT (OUTPATIENT)
Dept: OCCUPATIONAL MEDICINE | Facility: HOSPITAL | Age: 63
End: 2023-08-29
Attending: INTERNAL MEDICINE
Payer: MEDICAID

## 2023-08-31 ENCOUNTER — APPOINTMENT (OUTPATIENT)
Dept: OCCUPATIONAL MEDICINE | Facility: HOSPITAL | Age: 63
End: 2023-08-31
Attending: INTERNAL MEDICINE
Payer: MEDICAID

## 2023-09-05 RX ORDER — LOSARTAN POTASSIUM 50 MG/1
50 TABLET ORAL DAILY
Qty: 90 TABLET | Refills: 3 | Status: SHIPPED | OUTPATIENT
Start: 2023-09-05

## 2023-09-05 NOTE — TELEPHONE ENCOUNTER
Refill request is for a maintenance medication and has met the criteria specified in the Ambulatory Medication Refill Standing Order for eligibility, visits, laboratory, alerts and was sent to the requested pharmacy. Requested Prescriptions     Signed Prescriptions Disp Refills    losartan 50 MG Oral Tab 90 tablet 3     Sig: Take 1 tablet (50 mg total) by mouth daily.      Authorizing Provider: Ailyn Crowell     Ordering User: Fernando Olivia

## 2023-09-18 ENCOUNTER — HOSPITAL ENCOUNTER (OUTPATIENT)
Dept: MAMMOGRAPHY | Facility: HOSPITAL | Age: 63
Discharge: HOME | End: 2023-09-18
Attending: INTERNAL MEDICINE
Payer: MEDICAID

## 2023-09-18 DIAGNOSIS — Z12.9 CANCER SCREENING: ICD-10-CM

## 2023-09-18 PROCEDURE — 77067 SCR MAMMO BI INCL CAD: CPT | Performed by: INTERNAL MEDICINE

## 2023-09-18 PROCEDURE — 77063 BREAST TOMOSYNTHESIS BI: CPT | Performed by: INTERNAL MEDICINE

## 2023-10-19 ENCOUNTER — OFFICE VISIT (OUTPATIENT)
Dept: INTERNAL MEDICINE CLINIC | Facility: CLINIC | Age: 63
End: 2023-10-19

## 2023-10-19 ENCOUNTER — LAB ENCOUNTER (OUTPATIENT)
Dept: LAB | Age: 63
End: 2023-10-19
Attending: INTERNAL MEDICINE
Payer: MEDICAID

## 2023-10-19 VITALS
HEART RATE: 70 BPM | SYSTOLIC BLOOD PRESSURE: 130 MMHG | HEIGHT: 65 IN | BODY MASS INDEX: 35.85 KG/M2 | DIASTOLIC BLOOD PRESSURE: 66 MMHG | WEIGHT: 215.19 LBS | OXYGEN SATURATION: 95 % | TEMPERATURE: 98 F

## 2023-10-19 DIAGNOSIS — E03.9 HYPOTHYROIDISM, UNSPECIFIED TYPE: ICD-10-CM

## 2023-10-19 DIAGNOSIS — M79.642 BILATERAL HAND PAIN: ICD-10-CM

## 2023-10-19 DIAGNOSIS — E11.65 TYPE 2 DIABETES MELLITUS WITH HYPERGLYCEMIA, WITHOUT LONG-TERM CURRENT USE OF INSULIN (HCC): ICD-10-CM

## 2023-10-19 DIAGNOSIS — E78.5 HYPERLIPIDEMIA, UNSPECIFIED HYPERLIPIDEMIA TYPE: ICD-10-CM

## 2023-10-19 DIAGNOSIS — K21.9 GASTROESOPHAGEAL REFLUX DISEASE, UNSPECIFIED WHETHER ESOPHAGITIS PRESENT: ICD-10-CM

## 2023-10-19 DIAGNOSIS — M79.641 BILATERAL HAND PAIN: ICD-10-CM

## 2023-10-19 DIAGNOSIS — I10 ESSENTIAL HYPERTENSION: ICD-10-CM

## 2023-10-19 DIAGNOSIS — Z00.00 PHYSICAL EXAM: Primary | ICD-10-CM

## 2023-10-19 LAB
ALBUMIN SERPL-MCNC: 4.1 G/DL (ref 3.4–5)
ALBUMIN/GLOB SERPL: 1.1 {RATIO} (ref 1–2)
ALP LIVER SERPL-CCNC: 81 U/L
ALT SERPL-CCNC: 30 U/L
ANION GAP SERPL CALC-SCNC: 7 MMOL/L (ref 0–18)
AST SERPL-CCNC: 20 U/L (ref 15–37)
BASOPHILS # BLD AUTO: 0.05 X10(3) UL (ref 0–0.2)
BASOPHILS NFR BLD AUTO: 0.7 %
BILIRUB SERPL-MCNC: 0.3 MG/DL (ref 0.1–2)
BUN BLD-MCNC: 21 MG/DL (ref 7–18)
BUN/CREAT SERPL: 30.9 (ref 10–20)
CALCIUM BLD-MCNC: 9.8 MG/DL (ref 8.5–10.1)
CHLORIDE SERPL-SCNC: 106 MMOL/L (ref 98–112)
CHOLEST SERPL-MCNC: 131 MG/DL (ref ?–200)
CO2 SERPL-SCNC: 28 MMOL/L (ref 21–32)
CREAT BLD-MCNC: 0.68 MG/DL
DEPRECATED RDW RBC AUTO: 50.7 FL (ref 35.1–46.3)
EGFRCR SERPLBLD CKD-EPI 2021: 99 ML/MIN/1.73M2 (ref 60–?)
EOSINOPHIL # BLD AUTO: 0.16 X10(3) UL (ref 0–0.7)
EOSINOPHIL NFR BLD AUTO: 2.1 %
ERYTHROCYTE [DISTWIDTH] IN BLOOD BY AUTOMATED COUNT: 13.7 % (ref 11–15)
FASTING PATIENT LIPID ANSWER: NO
FASTING STATUS PATIENT QL REPORTED: NO
GLOBULIN PLAS-MCNC: 3.6 G/DL (ref 2.8–4.4)
GLUCOSE BLD-MCNC: 114 MG/DL (ref 70–99)
HCT VFR BLD AUTO: 43.3 %
HDLC SERPL-MCNC: 61 MG/DL (ref 40–59)
HGB BLD-MCNC: 13.5 G/DL
IMM GRANULOCYTES # BLD AUTO: 0.02 X10(3) UL (ref 0–1)
IMM GRANULOCYTES NFR BLD: 0.3 %
LDLC SERPL CALC-MCNC: 47 MG/DL (ref ?–100)
LYMPHOCYTES # BLD AUTO: 2.68 X10(3) UL (ref 1–4)
LYMPHOCYTES NFR BLD AUTO: 35.7 %
MCH RBC QN AUTO: 30.9 PG (ref 26–34)
MCHC RBC AUTO-ENTMCNC: 31.2 G/DL (ref 31–37)
MCV RBC AUTO: 99.1 FL
MONOCYTES # BLD AUTO: 0.8 X10(3) UL (ref 0.1–1)
MONOCYTES NFR BLD AUTO: 10.7 %
NEUTROPHILS # BLD AUTO: 3.79 X10 (3) UL (ref 1.5–7.7)
NEUTROPHILS # BLD AUTO: 3.79 X10(3) UL (ref 1.5–7.7)
NEUTROPHILS NFR BLD AUTO: 50.5 %
NONHDLC SERPL-MCNC: 70 MG/DL (ref ?–130)
OSMOLALITY SERPL CALC.SUM OF ELEC: 296 MOSM/KG (ref 275–295)
PLATELET # BLD AUTO: 268 10(3)UL (ref 150–450)
POTASSIUM SERPL-SCNC: 4.5 MMOL/L (ref 3.5–5.1)
PROT SERPL-MCNC: 7.7 G/DL (ref 6.4–8.2)
RBC # BLD AUTO: 4.37 X10(6)UL
SODIUM SERPL-SCNC: 141 MMOL/L (ref 136–145)
T4 FREE SERPL-MCNC: 1.3 NG/DL (ref 0.8–1.7)
TRIGL SERPL-MCNC: 136 MG/DL (ref 30–149)
TSI SER-ACNC: 1.12 MIU/ML (ref 0.36–3.74)
VLDLC SERPL CALC-MCNC: 19 MG/DL (ref 0–30)
WBC # BLD AUTO: 7.5 X10(3) UL (ref 4–11)

## 2023-10-19 PROCEDURE — 3075F SYST BP GE 130 - 139MM HG: CPT | Performed by: INTERNAL MEDICINE

## 2023-10-19 PROCEDURE — 83036 HEMOGLOBIN GLYCOSYLATED A1C: CPT | Performed by: INTERNAL MEDICINE

## 2023-10-19 PROCEDURE — 90472 IMMUNIZATION ADMIN EACH ADD: CPT | Performed by: INTERNAL MEDICINE

## 2023-10-19 PROCEDURE — 36415 COLL VENOUS BLD VENIPUNCTURE: CPT | Performed by: INTERNAL MEDICINE

## 2023-10-19 PROCEDURE — 90686 IIV4 VACC NO PRSV 0.5 ML IM: CPT | Performed by: INTERNAL MEDICINE

## 2023-10-19 PROCEDURE — 82043 UR ALBUMIN QUANTITATIVE: CPT | Performed by: INTERNAL MEDICINE

## 2023-10-19 PROCEDURE — 84443 ASSAY THYROID STIM HORMONE: CPT | Performed by: INTERNAL MEDICINE

## 2023-10-19 PROCEDURE — 84439 ASSAY OF FREE THYROXINE: CPT | Performed by: INTERNAL MEDICINE

## 2023-10-19 PROCEDURE — 90471 IMMUNIZATION ADMIN: CPT | Performed by: INTERNAL MEDICINE

## 2023-10-19 PROCEDURE — 85025 COMPLETE CBC W/AUTO DIFF WBC: CPT | Performed by: INTERNAL MEDICINE

## 2023-10-19 PROCEDURE — 82570 ASSAY OF URINE CREATININE: CPT | Performed by: INTERNAL MEDICINE

## 2023-10-19 PROCEDURE — 80053 COMPREHEN METABOLIC PANEL: CPT | Performed by: INTERNAL MEDICINE

## 2023-10-19 PROCEDURE — 99396 PREV VISIT EST AGE 40-64: CPT | Performed by: INTERNAL MEDICINE

## 2023-10-19 PROCEDURE — 3008F BODY MASS INDEX DOCD: CPT | Performed by: INTERNAL MEDICINE

## 2023-10-19 PROCEDURE — 80061 LIPID PANEL: CPT | Performed by: INTERNAL MEDICINE

## 2023-10-19 PROCEDURE — 3078F DIAST BP <80 MM HG: CPT | Performed by: INTERNAL MEDICINE

## 2023-10-19 PROCEDURE — 90677 PCV20 VACCINE IM: CPT | Performed by: INTERNAL MEDICINE

## 2023-10-19 RX ORDER — DULAGLUTIDE 0.75 MG/.5ML
0.75 INJECTION, SOLUTION SUBCUTANEOUS WEEKLY
Qty: 4 EACH | Refills: 6 | Status: SHIPPED | OUTPATIENT
Start: 2023-10-19

## 2023-10-19 RX ORDER — ROSUVASTATIN CALCIUM 10 MG/1
10 TABLET, COATED ORAL NIGHTLY
Qty: 90 TABLET | Refills: 3 | Status: SHIPPED | OUTPATIENT
Start: 2023-10-19

## 2023-10-20 LAB
CREAT UR-SCNC: 38.9 MG/DL
EST. AVERAGE GLUCOSE BLD GHB EST-MCNC: 194 MG/DL (ref 68–126)
HBA1C MFR BLD: 8.4 % (ref ?–5.7)
MICROALBUMIN UR-MCNC: 1.06 MG/DL
MICROALBUMIN/CREAT 24H UR-RTO: 27.2 UG/MG (ref ?–30)

## 2023-11-12 ENCOUNTER — TELEPHONE (OUTPATIENT)
Dept: INTERNAL MEDICINE CLINIC | Facility: CLINIC | Age: 63
End: 2023-11-12

## 2023-11-12 DIAGNOSIS — E03.9 HYPOTHYROIDISM, UNSPECIFIED TYPE: ICD-10-CM

## 2023-11-14 RX ORDER — LEVOTHYROXINE SODIUM 0.07 MG/1
75 TABLET ORAL
Qty: 90 TABLET | Refills: 3 | OUTPATIENT
Start: 2023-11-14

## 2023-11-14 RX ORDER — ROSUVASTATIN CALCIUM 10 MG/1
10 TABLET, COATED ORAL NIGHTLY
Qty: 90 TABLET | Refills: 3 | OUTPATIENT
Start: 2023-11-14

## 2023-11-14 RX ORDER — PIOGLITAZONEHYDROCHLORIDE 45 MG/1
45 TABLET ORAL DAILY
Qty: 90 TABLET | Refills: 3 | OUTPATIENT
Start: 2023-11-14

## 2023-11-14 RX ORDER — LOSARTAN POTASSIUM 50 MG/1
50 TABLET ORAL DAILY
Qty: 90 TABLET | Refills: 3 | OUTPATIENT
Start: 2023-11-14

## 2023-11-14 RX ORDER — MELOXICAM 15 MG/1
15 TABLET ORAL DAILY
Qty: 90 TABLET | Refills: 3 | OUTPATIENT
Start: 2023-11-14

## 2023-11-14 RX ORDER — HYDROCHLOROTHIAZIDE 25 MG/1
25 TABLET ORAL DAILY
Qty: 90 TABLET | Refills: 3 | OUTPATIENT
Start: 2023-11-14

## 2023-11-14 RX ORDER — EMPAGLIFLOZIN 25 MG/1
1 TABLET, FILM COATED ORAL DAILY
Qty: 30 TABLET | Refills: 11 | OUTPATIENT
Start: 2023-11-14

## 2023-11-14 NOTE — TELEPHONE ENCOUNTER
Dulaglutide (TRULICITY) 0.39 UQ/3.5HS Subcutaneous Solution Pen-injector Maria Ines Coy]      Patient Comment: Hi Doctor, This medication is working very well for me. Can I please get a refill of it? Last eRX sent 10/19/23 #4 with 6 refills  Patient has refills on file for the .75 mg/0.5 ml pen    10/19/23: We will start at the lowest dose and gradually increase as tolerated. Patient otherwise will continue her same pioglitazone, metformin and Jardiance. She will send me copy of a.m. Accu-Chek results in 2 weeks    Mychart sent to patient to see if she is having any side effects/ask for accu chek readings:  Kamari Green,  Glad to hear the medication is working well for you. Is your body tolerating it well or are you having any side effects from it? Dr Sho Raygoza had also wanted to see how your blood sugar readings have been. Can you please send a copy of your morning blood sugar readings? Thank you!   Take care,  Power Montiel RN

## 2023-11-14 NOTE — TELEPHONE ENCOUNTER
Ana sent to patient. She should have refills on file for all medications/too early to refill for local pharmacy      Hydrochlorothiazide sent 2/14/23 for year supply  Levothyroxine sent 2/14/23 for year supply  Meloxicam sent 2/14/23 for year supply  Actos sent 2/14/23 for year supply  Jardiance sent 4/12/23 for year supply  Losartan sent 9/5/23 for year supply  Crestor sent 10/19/23 for year supply  Metformin sent 6/6/23 for year supply     Current refill request refused due to refill is either a duplicate request or has active refills at the pharmacy. Check previous templates. Requested Prescriptions     Refused Prescriptions Disp Refills    hydroCHLOROthiazide 25 MG Oral Tab 90 tablet 3     Sig: Take 1 tablet (25 mg total) by mouth daily. Refused By: YANG Horta     Reason for Refusal: Patient has requested refill too soon    levothyroxine 75 MCG Oral Tab 90 tablet 3     Sig: Take 1 tablet (75 mcg total) by mouth before breakfast.     Refused By: YANG Horta     Reason for Refusal: Patient has requested refill too soon    Meloxicam 15 MG Oral Tab 90 tablet 3     Sig: Take 1 tablet (15 mg total) by mouth daily. Refused By: YANG Horta     Reason for Refusal: Patient has requested refill too soon    pioglitazone 45 MG Oral Tab 90 tablet 3     Sig: Take 1 tablet (45 mg total) by mouth daily. Refused By: Mi Horta     Reason for Refusal: Patient has requested refill too soon    Empagliflozin (JARDIANCE) 25 MG Oral Tab 30 tablet 11     Sig: Take 1 tablet by mouth daily. Refused By: YANG Horta     Reason for Refusal: Patient has requested refill too soon    metFORMIN HCl 1000 MG Oral Tab 60 tablet 11     Sig: Take 1 tablet (1,000 mg total) by mouth 2 (two) times daily with meals.      Refused By: YANG Horta     Reason for Refusal: Patient has requested refill too soon    losartan 50 MG Oral Tab 90 tablet 3     Sig: Take 1 tablet (50 mg total) by mouth daily. Refused By: Bradley Salazar     Reason for Refusal: Patient has requested refill too soon    rosuvastatin 10 MG Oral Tab 90 tablet 3     Sig: Take 1 tablet (10 mg total) by mouth nightly.      Refused By: Bradley Salazar     Reason for Refusal: Patient has requested refill too soon

## 2023-11-15 RX ORDER — DULAGLUTIDE 0.75 MG/.5ML
0.75 INJECTION, SOLUTION SUBCUTANEOUS WEEKLY
Qty: 4 EACH | Refills: 6 | Status: SHIPPED | OUTPATIENT
Start: 2023-11-15

## 2023-11-15 NOTE — TELEPHONE ENCOUNTER
To  if you want to titrate dose;  pt response to 0.75mg below  At first I was experiencing a few side effects such as nausea and having a sensation of being really full, but I no longer have these side effects. My readings in the morning have been between 130-143.

## 2023-12-21 RX ORDER — PANTOPRAZOLE SODIUM 40 MG/1
40 TABLET, DELAYED RELEASE ORAL
Qty: 30 TABLET | Refills: 3 | OUTPATIENT
Start: 2023-12-21

## 2023-12-21 NOTE — TELEPHONE ENCOUNTER
Called pt - pt picked up and hung up. I dont see any telephone calls documented from her either. Will deny for now, seems like pharmacy generated this.      Refill request has failed the Ambulatory Medication Refill Standing Order and is routed to the primary physician to review the following:    Requested Prescriptions     Refused Prescriptions Disp Refills    PANTOPRAZOLE 40 MG Oral Tab EC [Pharmacy Med Name: PANTOPRAZOLE 40MG TABLETS] 30 tablet 3     Sig: TAKE 1 TABLET(40 MG) BY MOUTH EVERY MORNING BEFORE BREAKFAST     Refused By: Kye Conley     Reason for Refusal: Refill not appropriate

## 2024-01-16 ENCOUNTER — OFFICE VISIT (OUTPATIENT)
Dept: INTERNAL MEDICINE CLINIC | Facility: CLINIC | Age: 64
End: 2024-01-16

## 2024-01-16 ENCOUNTER — TELEPHONE (OUTPATIENT)
Dept: INTERNAL MEDICINE CLINIC | Facility: CLINIC | Age: 64
End: 2024-01-16

## 2024-01-16 VITALS
DIASTOLIC BLOOD PRESSURE: 82 MMHG | HEIGHT: 65 IN | TEMPERATURE: 98 F | BODY MASS INDEX: 33.99 KG/M2 | WEIGHT: 204 LBS | HEART RATE: 75 BPM | SYSTOLIC BLOOD PRESSURE: 128 MMHG | OXYGEN SATURATION: 96 %

## 2024-01-16 DIAGNOSIS — R05.1 ACUTE COUGH: Primary | ICD-10-CM

## 2024-01-16 DIAGNOSIS — J02.9 SORE THROAT: ICD-10-CM

## 2024-01-16 LAB
CONTROL LINE PRESENT WITH A CLEAR BACKGROUND (YES/NO): YES YES/NO
KIT LOT #: NORMAL NUMERIC
STREP GRP A CUL-SCR: NEGATIVE

## 2024-01-16 PROCEDURE — 3079F DIAST BP 80-89 MM HG: CPT | Performed by: INTERNAL MEDICINE

## 2024-01-16 PROCEDURE — 87880 STREP A ASSAY W/OPTIC: CPT | Performed by: INTERNAL MEDICINE

## 2024-01-16 PROCEDURE — 99214 OFFICE O/P EST MOD 30 MIN: CPT | Performed by: INTERNAL MEDICINE

## 2024-01-16 PROCEDURE — 3074F SYST BP LT 130 MM HG: CPT | Performed by: INTERNAL MEDICINE

## 2024-01-16 PROCEDURE — 3008F BODY MASS INDEX DOCD: CPT | Performed by: INTERNAL MEDICINE

## 2024-01-16 RX ORDER — AMOXICILLIN AND CLAVULANATE POTASSIUM 875; 125 MG/1; MG/1
1 TABLET, FILM COATED ORAL 2 TIMES DAILY
Qty: 20 TABLET | Refills: 0 | Status: SHIPPED | OUTPATIENT
Start: 2024-01-16 | End: 2024-01-26

## 2024-01-16 NOTE — TELEPHONE ENCOUNTER
To nursing staff, please relay the following to Norma Noriega:    Strep test was negative.     Antibiotic Rx for sinusitis (Augmentin) was sent to the patient's pharmacy to be taken twice daily for 10 days.    Sometimes people can experience GI upset, it is okay to be taken with food. If she cannot tolerate the medication, she should let us know.    Thank you!

## 2024-01-16 NOTE — PROGRESS NOTES
Norma Noriega is a 63 year old female.  Chief Complaint   Patient presents with    Checkup    Cough     Throat pain and productive cough, dry throat, hurts to swallow       HPI:   Norma Noriega is a 63 year old female who presents acutely for URI sx.    Patient accompanied by her daughter who provides [employer] interpretation.    Patient c/o cough productive of yellow sputum for a few weeks a/w sore throat and a little bit of nasal congestion. Drinking tea and took claritin without relief of symptoms. Patient denies headaches, fevers, dyspnea, earaches, n/v/d.    Wt Readings from Last 6 Encounters:   01/16/24 204 lb (92.5 kg)   10/19/23 215 lb 3.2 oz (97.6 kg)   08/16/23 210 lb (95.3 kg)   07/19/23 211 lb 8 oz (95.9 kg)   03/23/23 202 lb 9.6 oz (91.9 kg)   12/01/22 199 lb (90.3 kg)     Body mass index is 33.95 kg/m².       Current Outpatient Medications   Medication Sig Dispense Refill    Dulaglutide (TRULICITY) 0.75 MG/0.5ML Subcutaneous Solution Pen-injector Inject 0.75 mg into the skin once a week. 4 each 6    rosuvastatin 10 MG Oral Tab Take 1 tablet (10 mg total) by mouth nightly. 90 tablet 3    losartan 50 MG Oral Tab Take 1 tablet (50 mg total) by mouth daily. 90 tablet 3    Glucose Blood (ONETOUCH ULTRA) In Vitro Strip TEST TWICE DAILY AS DIRECTED 200 strip 3    metFORMIN HCl 1000 MG Oral Tab Take 1 tablet (1,000 mg total) by mouth 2 (two) times daily with meals. 60 tablet 11    JARDIANCE 25 MG Oral Tab TAKE 1 TABLET BY MOUTH DAILY 30 tablet 11    HYDROCHLOROTHIAZIDE 25 MG Oral Tab TAKE 1 TABLET(25 MG) BY MOUTH DAILY 90 tablet 3    LEVOTHYROXINE 75 MCG Oral Tab TAKE 1 TABLET(75 MCG) BY MOUTH BEFORE BREAKFAST 90 tablet 3    MELOXICAM 15 MG Oral Tab TAKE 1 TABLET(15 MG) BY MOUTH DAILY 90 tablet 3    PIOGLITAZONE 45 MG Oral Tab TAKE 1 TABLET(45 MG) BY MOUTH DAILY 90 tablet 3    Multiple Vitamins-Minerals (WOMENS ONE DAILY) Oral Tab Take by mouth.      pantoprazole 40 MG Oral Tab EC Take 1 tablet (40 mg total)  by mouth every morning before breakfast. (Patient not taking: Reported on 2024) 30 tablet 3      Past Medical History:   Diagnosis Date    Benign essential HTN     Diabetes (HCC)     High blood pressure     Hypothyroidism       Past Surgical History:   Procedure Laterality Date          COLONOSCOPY N/A 2021     colonoscopy done --lipoma only--needs f/u colonpscopy 10 years    REMOVAL GALLBLADDER        Family History   Problem Relation Age of Onset    Other (cad) Mother     Diabetes Father     Diabetes Sister     Diabetes Brother     Glaucoma Neg     Macular degeneration Neg       Social History:   Social History     Socioeconomic History    Marital status:    Tobacco Use    Smoking status: Never    Smokeless tobacco: Never   Vaping Use    Vaping Use: Never used   Substance and Sexual Activity    Alcohol use: Never    Drug use: Never          REVIEW OF SYSTEMS:   GENERAL: denies fevers  HEENT: + nasal congestion, sinus pain, + sore throat  LUNGS: denies shortness of breath with exertion, + cough productive of yellow sputum  CARDIOVASCULAR: denies chest pain, pressure, or palpitations  GI: denies abdominal pain, n/v/d  NEURO: denies headaches    EXAM:   /82   Pulse 75   Temp 98.1 °F (36.7 °C)   Ht 5' 5\" (1.651 m)   Wt 204 lb (92.5 kg)   SpO2 96%   BMI 33.95 kg/m²     GENERAL: well developed, well nourished, in no apparent distress  HEENT: erythematous oropharynx without exudates, normal TM's b/l  EYES: PERRLA, EOMI, conjunctivae are pink  NECK: supple, no cervical or supraclavicular LAD, + ttp anterior R cervical region  LUNGS: clear to auscultation b/l, no w/r/r  CARDIO: RRR, normal S1S2, +hsm  NEURO: A&O x 3, moves all 4 extremities spontaneously      ASSESSMENT AND PLAN:   Norma Noriega is a 63 year old female who presents acutely for URI sx.    Sinusitis  - nasal congestion with purulent discharge on exam, recommend tx for acute sinusitis with Augmentin 10 day  course, NKDA confirmed by patient    Acute cough  - likely PND vs bronchitis from URI, less likely pna given reassuring physical exam  - advised steam, mucinex for conservative measures  - SARS-CoV-2/Flu A and B/RSV by PCR ([facility]) [E] *Collect in Office!; Future    Sore throat  - possibly from PND vs r/o strep  - CENTOR criteria 1   - POC Rapid Strep [[id number]], negative    Advised call/RTC if sx do not improve despite above measures. Patient and her daughter verbalized understanding and agreed to plan.    RTC as previously scheduled with PCP (2/2/24) or sooner PRN.    For E/M code - 30 minutes spent reviewing performing chart review, obtaining a history, performing a physical exam, reviewing the assessment/plan, placing orders, and completing documentation.     Amanda Connor DO  1/16/2024  9:24 AM

## 2024-01-17 ENCOUNTER — TELEPHONE (OUTPATIENT)
Dept: INTERNAL MEDICINE CLINIC | Facility: CLINIC | Age: 64
End: 2024-01-17

## 2024-01-17 LAB
FLUAV + FLUBV RNA SPEC NAA+PROBE: NOT DETECTED
FLUAV + FLUBV RNA SPEC NAA+PROBE: NOT DETECTED
RSV RNA SPEC NAA+PROBE: NOT DETECTED
SARS-COV-2 RNA RESP QL NAA+PROBE: NOT DETECTED

## 2024-01-17 NOTE — TELEPHONE ENCOUNTER
To nursing staff, please relay the following to Norma Noriega:    COVID, flu, RSV testing was all negative.     Thank you!

## 2024-02-01 RX ORDER — PIOGLITAZONEHYDROCHLORIDE 45 MG/1
45 TABLET ORAL DAILY
Qty: 90 TABLET | Refills: 3 | Status: SHIPPED | OUTPATIENT
Start: 2024-02-01

## 2024-02-01 RX ORDER — MELOXICAM 15 MG/1
15 TABLET ORAL DAILY
Qty: 90 TABLET | Refills: 3 | Status: SHIPPED | OUTPATIENT
Start: 2024-02-01

## 2024-02-01 RX ORDER — HYDROCHLOROTHIAZIDE 25 MG/1
25 TABLET ORAL DAILY
Qty: 90 TABLET | Refills: 3 | Status: SHIPPED | OUTPATIENT
Start: 2024-02-01

## 2024-02-01 NOTE — TELEPHONE ENCOUNTER
Refill request is for a maintenance medication and has met the criteria specified in the Ambulatory Medication Refill Standing Order for eligibility, visits, laboratory, alerts and was sent to the requested pharmacy.    Requested Prescriptions     Signed Prescriptions Disp Refills    Meloxicam 15 MG Oral Tab 90 tablet 3     Sig: Take 1 tablet (15 mg total) by mouth daily.     Authorizing Provider: MICHAEL PALACIO     Ordering User: DALE AKERS    pioglitazone 45 MG Oral Tab 90 tablet 3     Sig: Take 1 tablet (45 mg total) by mouth daily.     Authorizing Provider: MICHAEL PALACIO     Ordering User: DALE AKERS    hydroCHLOROthiazide 25 MG Oral Tab 90 tablet 3     Sig: Take 1 tablet (25 mg total) by mouth daily.     Authorizing Provider: MICHAEL PALACIO     Ordering User: DALE AKERS

## 2024-02-02 ENCOUNTER — OFFICE VISIT (OUTPATIENT)
Dept: INTERNAL MEDICINE CLINIC | Facility: CLINIC | Age: 64
End: 2024-02-02

## 2024-02-02 VITALS
SYSTOLIC BLOOD PRESSURE: 122 MMHG | TEMPERATURE: 98 F | HEART RATE: 81 BPM | OXYGEN SATURATION: 95 % | DIASTOLIC BLOOD PRESSURE: 86 MMHG

## 2024-02-02 DIAGNOSIS — E03.9 HYPOTHYROIDISM, UNSPECIFIED TYPE: ICD-10-CM

## 2024-02-02 DIAGNOSIS — Z13.0 SCREENING FOR DEFICIENCY ANEMIA: ICD-10-CM

## 2024-02-02 DIAGNOSIS — I10 ESSENTIAL HYPERTENSION: ICD-10-CM

## 2024-02-02 DIAGNOSIS — E11.9 DIABETES MELLITUS TYPE 2 WITHOUT RETINOPATHY (HCC): ICD-10-CM

## 2024-02-02 DIAGNOSIS — R05.3 CHRONIC COUGH: ICD-10-CM

## 2024-02-02 DIAGNOSIS — M79.642 BILATERAL HAND PAIN: ICD-10-CM

## 2024-02-02 DIAGNOSIS — R05.1 ACUTE COUGH: Primary | ICD-10-CM

## 2024-02-02 DIAGNOSIS — E11.65 TYPE 2 DIABETES MELLITUS WITH HYPERGLYCEMIA, WITHOUT LONG-TERM CURRENT USE OF INSULIN (HCC): ICD-10-CM

## 2024-02-02 DIAGNOSIS — K21.9 GASTROESOPHAGEAL REFLUX DISEASE, UNSPECIFIED WHETHER ESOPHAGITIS PRESENT: ICD-10-CM

## 2024-02-02 DIAGNOSIS — M79.641 BILATERAL HAND PAIN: ICD-10-CM

## 2024-02-02 DIAGNOSIS — F32.A DEPRESSION, UNSPECIFIED DEPRESSION TYPE: ICD-10-CM

## 2024-02-02 DIAGNOSIS — E78.5 HYPERLIPIDEMIA, UNSPECIFIED HYPERLIPIDEMIA TYPE: ICD-10-CM

## 2024-02-02 LAB
CARTRIDGE EXPIRATION DATE: ABNORMAL DATE
CARTRIDGE LOT#: ABNORMAL NUMERIC
HEMOGLOBIN A1C: 8.2 % (ref 4.3–5.6)

## 2024-02-02 PROCEDURE — 83036 HEMOGLOBIN GLYCOSYLATED A1C: CPT | Performed by: INTERNAL MEDICINE

## 2024-02-02 PROCEDURE — 99215 OFFICE O/P EST HI 40 MIN: CPT | Performed by: INTERNAL MEDICINE

## 2024-02-02 RX ORDER — DOXYCYCLINE HYCLATE 100 MG
100 TABLET ORAL 2 TIMES DAILY
Qty: 20 TABLET | Refills: 0 | Status: SHIPPED | OUTPATIENT
Start: 2024-02-02 | End: 2024-02-12

## 2024-02-02 RX ORDER — CODEINE PHOSPHATE/GUAIFENESIN 10-100MG/5
5 LIQUID (ML) ORAL EVERY 8 HOURS PRN
Qty: 118 ML | Refills: 0 | Status: SHIPPED | OUTPATIENT
Start: 2024-02-02 | End: 2024-02-16

## 2024-02-02 RX ORDER — BENZONATATE 200 MG/1
200 CAPSULE ORAL 3 TIMES DAILY PRN
Qty: 30 CAPSULE | Refills: 0 | Status: SHIPPED | OUTPATIENT
Start: 2024-02-02

## 2024-02-02 NOTE — PROGRESS NOTES
Chief Complaint:   Chief Complaint   Patient presents with    Checkup     3 month follow up- cough, sore throat         HPI:     Ms. HICKS is a 63 year old female PMHX as below coming in for follow-up.    Of note, seen recently by Dr. Connor for evaluation of cough.  Production of yellow sputum and nasal congestion over the course of few weeks.  Received Augmentin for sinusitis.    Today, she is feeling 1 month. She has been feeling coughing, fevers, sore throat, and her R ear. No sick contacts. The Abx helped when she was taking it, but once it was done. Coughing mucus clear. Congestion has improved. No diarrhea, dysuria.    She had some pain in the lower back and into the buttocks. That is getting better. The trigger finger is getting better.     Fruits and veggies,  Oranges, bananas, blueberries, strawberries, kiwis.      Eating and drinking OK.     Exercises - not as much due to fatigue, weather to increase once the weather improves    PMHX  Type 2 diabetes: On Trulicity 0.75 mg weekly, pioglitazone 45 mg daily, metformin 1000 mg twice a day, Jardiance 25 mg daily. She is still on the trulicity. But she feels like there is an abdominal sensation with     Hypertension: On losartan, hydrochlorothiazide 25 mg daily    Hyperlipidemia: On rosuvastatin 10 mg daily    Hypothyroidism: On levothyroxine 75 mcg once a day    GERD: Protonix 40 mg daily    Trigger finger: Bilateral hand pain refractory to occupational therapy.  Did complete EMG/NCS for carpal tunnel syndrome which was negative.  Did have trigger finger injections with orthopedic surgery.  Patient declined surgical management.  Was referred to Dr. Copeland for second opinion.  On meloxicam 15 mg daily    Past Medical History:   Diagnosis Date    Benign essential HTN     Diabetes (HCC)     High blood pressure     Hypothyroidism      Past Surgical History:   Procedure Laterality Date          COLONOSCOPY N/A 2021     colonoscopy done --lipoma  only--needs f/u colonpscopy 10 years    REMOVAL GALLBLADDER  1998     Social History:  Social History     Socioeconomic History    Marital status:    Tobacco Use    Smoking status: Never    Smokeless tobacco: Never   Vaping Use    Vaping Use: Never used   Substance and Sexual Activity    Alcohol use: Never    Drug use: Never     Family History:  Family History   Problem Relation Age of Onset    Other (cad) Mother     Diabetes Father     Diabetes Sister     Diabetes Brother     Glaucoma Neg     Macular degeneration Neg      Allergies:  No Known Allergies  Current Meds:  Current Outpatient Medications   Medication Sig Dispense Refill    Meloxicam 15 MG Oral Tab Take 1 tablet (15 mg total) by mouth daily. 90 tablet 3    pioglitazone 45 MG Oral Tab Take 1 tablet (45 mg total) by mouth daily. 90 tablet 3    hydroCHLOROthiazide 25 MG Oral Tab Take 1 tablet (25 mg total) by mouth daily. 90 tablet 3    Dulaglutide (TRULICITY) 0.75 MG/0.5ML Subcutaneous Solution Pen-injector Inject 0.75 mg into the skin once a week. 4 each 6    rosuvastatin 10 MG Oral Tab Take 1 tablet (10 mg total) by mouth nightly. 90 tablet 3    losartan 50 MG Oral Tab Take 1 tablet (50 mg total) by mouth daily. 90 tablet 3    Glucose Blood (ONETOUCH ULTRA) In Vitro Strip TEST TWICE DAILY AS DIRECTED 200 strip 3    metFORMIN HCl 1000 MG Oral Tab Take 1 tablet (1,000 mg total) by mouth 2 (two) times daily with meals. 60 tablet 11    JARDIANCE 25 MG Oral Tab TAKE 1 TABLET BY MOUTH DAILY 30 tablet 11    LEVOTHYROXINE 75 MCG Oral Tab TAKE 1 TABLET(75 MCG) BY MOUTH BEFORE BREAKFAST 90 tablet 3    Multiple Vitamins-Minerals (WOMENS ONE DAILY) Oral Tab Take by mouth.      pantoprazole 40 MG Oral Tab EC Take 1 tablet (40 mg total) by mouth every morning before breakfast. (Patient not taking: Reported on 1/16/2024) 30 tablet 3      Counseling given: Not Answered       REVIEW OF SYSTEMS:   Positive Findings indicated in BOLD    Constitutional: Fever, Chills,  Weight Gain, Weight Loss, Night Sweats, Fatigue, Malaise  ENT/Mouth:  Hearing Changes, Ear Pain, Nasal Congestion, Sinus Pain, Hoarseness, Sore throat, Rhinorrhea, Swallowing Difficulty  Eyes: Eye Pain, Swelling, Redness, Foreign Body, Discharge, Vision Changes  Cardiovascular: Chest Pain, SOB, PND, Dyspnea on Exertion, Orthopnea, Claudication, Edema, Palpitations  Respiratory: Cough, Sputum, Wheezing, Shortness of breath  Gastrointestinal: Nausea, Vomiting, Diarrhea, Constipation, Pain, Heartburn, Dysphagia, Bloody stools, Tarry stools  Genitourinary: Dysmenorrhea, Dysuria, Urinary Frequency, Hematuria, Urinary Incontinence, Urgency,  Flank Pain  Musculoskeletal: Arthralgias, Myalgias, Joint Swelling, Joint Stiffness, Back Pain, Neck Pain  Integumentary: Skin Lesions, Pruritis, Hair Changes, Jaundice, Nail changes  Neuro: Weakness, Numbness, Paresthesias, Loss of Consciousness, Syncope, Dizziness, Headache, Falls  Psych: Anxiety, Depression, Insomnia, Suicidal Ideation, Homicidal ideation, Memory Changes  Heme/Lymph: Bruising, Bleeding, Lymphadenopathy  Endocrine: Polyuria, Polydipsia, Temperature Intolerance    EXAM:   Vital Signs:  Blood pressure 122/86, pulse 81, temperature 97.9 °F (36.6 °C), SpO2 95%, not currently breastfeeding.     Constitutional: No acute distress. Alert and oriented x 3.  Eyes: EOMI, PERRLA, clear sclera b/l  HENT: NCAT, Moist mucous membranes, Oropharynx without erythema or exudates  Cardiovascular: S1, S2, no S3, no S4, Regular rate and rhythm, No murmurs/gallops/rubs.   Vascular: Equal pulses 2+ carotids no bruits or thrills/radial/DP/PT bilaterally  Respiratory: Clear to auscultation bilaterally.  No wheezes/rales/rhonchi  Gastrointestinal: Soft, nontender, nondistended. Positive bowel sounds x 4. No rebound tenderness. No hepatomegaly, No splenomegaly  Genitourinary: No CVA tenderness bilaterally  Neurologic: No focal neurological deficits, CN II-XII intact, light touch  intact,  Musculoskeletal: Full range of motion of all extremities, no clubbing/swelling/edema  Skin: No lesions, No erythema, no jaundice, Cap Refill < 2s  Psychiatric: Appropriate mood and affect  Heme/Lymph/Immune: No cervical LAD          DATA REVIEWED   Labs:  Recent Results (from the past 8760 hour(s))   Comp Metabolic Panel (14) [E]    Collection Time: 10/19/23  4:31 PM   Result Value Ref Range    Glucose 114 (H) 70 - 99 mg/dL    Sodium 141 136 - 145 mmol/L    Potassium 4.5 3.5 - 5.1 mmol/L    Chloride 106 98 - 112 mmol/L    CO2 28.0 21.0 - 32.0 mmol/L    Anion Gap 7 0 - 18 mmol/L    BUN 21 (H) 7 - 18 mg/dL    Creatinine 0.68 0.55 - 1.02 mg/dL    BUN/CREA Ratio 30.9 (H) 10.0 - 20.0    Calcium, Total 9.8 8.5 - 10.1 mg/dL    Calculated Osmolality 296 (H) 275 - 295 mOsm/kg    eGFR-Cr 99 >=60 mL/min/1.73m2    ALT 30 13 - 56 U/L    AST 20 15 - 37 U/L    Alkaline Phosphatase 81 50 - 130 U/L    Bilirubin, Total 0.3 0.1 - 2.0 mg/dL    Total Protein 7.7 6.4 - 8.2 g/dL    Albumin 4.1 3.4 - 5.0 g/dL    Globulin  3.6 2.8 - 4.4 g/dL    A/G Ratio 1.1 1.0 - 2.0    Patient Fasting for CMP? No      *Note: Due to a large number of results and/or encounters for the requested time period, some results have not been displayed. A complete set of results can be found in Results Review.       Recent Results (from the past 8760 hour(s))   CBC W/ DIFFERENTIAL    Collection Time: 10/19/23  4:31 PM   Result Value Ref Range    WBC 7.5 4.0 - 11.0 x10(3) uL    RBC 4.37 3.80 - 5.30 x10(6)uL    HGB 13.5 12.0 - 16.0 g/dL    HCT 43.3 35.0 - 48.0 %    MCV 99.1 80.0 - 100.0 fL    MCH 30.9 26.0 - 34.0 pg    MCHC 31.2 31.0 - 37.0 g/dL    RDW-SD 50.7 (H) 35.1 - 46.3 fL    RDW 13.7 11.0 - 15.0 %    .0 150.0 - 450.0 10(3)uL    Neutrophil Absolute Prelim 3.79 1.50 - 7.70 x10 (3) uL    Neutrophil Absolute 3.79 1.50 - 7.70 x10(3) uL    Lymphocyte Absolute 2.68 1.00 - 4.00 x10(3) uL    Monocyte Absolute 0.80 0.10 - 1.00 x10(3) uL    Eosinophil  Absolute 0.16 0.00 - 0.70 x10(3) uL    Basophil Absolute 0.05 0.00 - 0.20 x10(3) uL    Immature Granulocyte Absolute 0.02 0.00 - 1.00 x10(3) uL    Neutrophil % 50.5 %    Lymphocyte % 35.7 %    Monocyte % 10.7 %    Eosinophil % 2.1 %    Basophil % 0.7 %    Immature Granulocyte % 0.3 %     *Note: Due to a large number of results and/or encounters for the requested time period, some results have not been displayed. A complete set of results can be found in Results Review.           Imaging:  Hand x-ray 7/31/2023    Impression   CONCLUSION: Mild narrowing throughout the DIP and PIP joints of the bilateral hands.  No erosions       Mammogram 9/18/2023    Impression   CONCLUSION:   There is no mammographic evidence of malignancy in either breast. As long as patient's clinical breast exam remains unchanged, annual screening mammogram is recommended.     BI-RADS CATEGORY:    DIAGNOSTIC CATEGORY 1--NEGATIVE ASSESSMENT.            ASSESSMENT AND PLAN:     Acute sinusitis  Visit with Dr. Connor reviewed as above, treated with Augmentin  -Had initial improvement, but then had recurrence of symptoms.  - Continue with antihistamine, Flonase as needed  - Concern for possible recurrence, will proceed with doxycycline 1 tablet twice a day for 10 days  - Benzonatate for mild to moderate cough  - For moderate to severe cough, will try to guaifenesin with codeine.  Aware to take it first at nighttime to avoid drowsiness, sleepiness during the day.    Type 2 diabetes:   Recent A1c:   HEMOGLOBIN A1C (%)   Date Value   07/19/2023 7.9 (A)     HgbA1C (%)   Date Value   10/19/2023 8.4 (H)     Recent urine microalbumin: No components found for: \"URINEMICROALBUMIN\"  Current medications: Trulicity 0.75 mg weekly, pioglitazone 45 mg daily, metformin 1000 mg twice a day, Jardiance 25 mg daily  Eye exam: May be due for eye exam soon  Foot exam: Check feet daily  - Interested in getting sugars down further.  Provided resources and how best to  improve nutrition.  A1c starting to downtrend  - Likely will increase Trulicity but will wait until next visit.    Hypertension  -Blood pressures remain at goal  - Continue checking blood pressures at home   - on losartan, hydrochlorothiazide 25 mg daily    Hyperlipidemia  - Cholesterol levels within good control  - On rosuvastatin 10 mg daily    Hypothyroidism  - Last set of TFTs within normal limits  - On levothyroxine 75 mcg once a day    GERD  - Try to avoid triggering factors  - Protonix 40 mg daily    Trigger finger  Bilateral hand pain refractory to occupational therapy.  Did complete EMG/NCS for carpal tunnel syndrome which was negative.    - Did have trigger finger injections with orthopedic surgery.  Patient declined surgical management.    - Was referred to Dr. Copeland for second opinion.    - On meloxicam 15 mg daily  - Can continue using Tylenol 500-650 mg every 4-6 hours as needed.  Maximum dose is less than 4000 mg in 24 hours  - Continue with home stretches and exercises         Orders This Visit:  Orders Placed This Encounter   Procedures    CBC With Differential With Platelet    Comp Metabolic Panel (14)    Lipid Panel    TSH W Reflex To Free T4    HEMOGLOBIN A1C    Hemoglobin A1C       Meds This Visit:  Requested Prescriptions      No prescriptions requested or ordered in this encounter       Imaging & Referrals:  None     Health Maintenance  Due for Pap smear  Due for COVID dose 5, shingles vaccine series, tetanus shot    Spent 40 minutes obtaining history, evaluating patient, discussing treatment options, diet, exercise, review of available labs and radiology reports, and completing documentation.       Return to clinic in 3 months for diabetic follow-up    Wally Alvarado MD, 02/03/24, 5:23 AM

## 2024-02-02 NOTE — PATIENT INSTRUCTIONS
You were seen in clinic today for follow-up.     Let's treat you for an ear infection, possible sinus infection  - Complete course of antibiotics: 1 tablet twice a day of doxycycline      For the cough we recommended:    1 capsule 3 times a day as needed during the day  Guaifenesin codeine cough try first at nighttime as this can cause drowsiness, sleepiness.    Notify us if still no improvement as we should consider further testing at that time.    Continue checking your blood pressures at home    Continue checking your blood sugars at home  - May be due for diabetic eye examination in the near future  - Lets continue trying the Trulicity as this can help promote weight loss and improve sugar numbers. We may increase this dosage over time    We did place fasting blood work to be completed prior to next visit.  Lets plan to meet again in 3-4 months for follow-up      ---------------------------------------------------------------------------    Usted fue atendido hoy en la clínica para un seguimiento.    Vamos a tratarle por chilango infección de oído, posible infección de los senos nasales.  - Ciclo completo de antibióticos: 1 comprimido dos veces al día de doxiciclina      Para la tos recomendamos:    1 cápsula 3 veces al día según sea necesario criss el día  Pruebe bertin la tos con guaifenesina y codeína criss la noche, ya que esto puede provocar somnolencia y somnolencia.    Notifíquenos si aún no hay mejoras, ya que deberíamos considerar realizar más pruebas en mar momento.    Continúe controlando adams presión arterial en casa.    Continúe controlando anju niveles de azúcar en guicho en casa.  - Es posible que deba realizarse un examen ocular para diabéticos en un futuro próximo.  - Sigamos probando Trulicity, ya que puede ayudar a promover la pérdida de peso y mejorar los niveles de azúcar. Podemos aumentar esta dosis con el tiempo.    Realizamos análisis de guicho en ayunas para completarlos antes de la próxima  visita. Planeemos reunirnos nuevamente en 3-4 meses para un seguimiento.

## 2024-03-25 ENCOUNTER — OFFICE VISIT (OUTPATIENT)
Dept: INTERNAL MEDICINE CLINIC | Facility: CLINIC | Age: 64
End: 2024-03-25

## 2024-03-25 VITALS
OXYGEN SATURATION: 97 % | SYSTOLIC BLOOD PRESSURE: 114 MMHG | HEART RATE: 74 BPM | WEIGHT: 209.38 LBS | DIASTOLIC BLOOD PRESSURE: 64 MMHG | HEIGHT: 65 IN | BODY MASS INDEX: 34.88 KG/M2 | TEMPERATURE: 98 F

## 2024-03-25 DIAGNOSIS — M54.50 ACUTE RIGHT-SIDED LOW BACK PAIN WITHOUT SCIATICA: Primary | ICD-10-CM

## 2024-03-25 PROCEDURE — 99214 OFFICE O/P EST MOD 30 MIN: CPT | Performed by: INTERNAL MEDICINE

## 2024-03-25 RX ORDER — CYCLOBENZAPRINE HCL 10 MG
10 TABLET ORAL 3 TIMES DAILY
Qty: 30 TABLET | Refills: 0 | Status: SHIPPED | OUTPATIENT
Start: 2024-03-25 | End: 2024-04-14

## 2024-03-25 NOTE — PROGRESS NOTES
Norma Noriega is a 63 year old female.  Chief Complaint   Patient presents with    Back Pain     Sudden onset LBP 3-23.  No injury       HPI:   Norma Noriega is a 63 year old female who presents for:    Low back pain x 3/23    Went to wash room to sit, and when getting up had pain  No issue with urination  Feels weakness in R leg    3 yrs ago-had back pain, improved gradually.   This feels similar    Wt Readings from Last 6 Encounters:   03/25/24 209 lb 6.4 oz (95 kg)   01/16/24 204 lb (92.5 kg)   10/19/23 215 lb 3.2 oz (97.6 kg)   08/16/23 210 lb (95.3 kg)   07/19/23 211 lb 8 oz (95.9 kg)   03/23/23 202 lb 9.6 oz (91.9 kg)     Body mass index is 34.85 kg/m².       Current Outpatient Medications   Medication Sig Dispense Refill    Meloxicam 15 MG Oral Tab Take 1 tablet (15 mg total) by mouth daily. 90 tablet 3    pioglitazone 45 MG Oral Tab Take 1 tablet (45 mg total) by mouth daily. 90 tablet 3    hydroCHLOROthiazide 25 MG Oral Tab Take 1 tablet (25 mg total) by mouth daily. 90 tablet 3    Dulaglutide (TRULICITY) 0.75 MG/0.5ML Subcutaneous Solution Pen-injector Inject 0.75 mg into the skin once a week. 4 each 6    rosuvastatin 10 MG Oral Tab Take 1 tablet (10 mg total) by mouth nightly. 90 tablet 3    losartan 50 MG Oral Tab Take 1 tablet (50 mg total) by mouth daily. 90 tablet 3    Glucose Blood (ONETOUCH ULTRA) In Vitro Strip TEST TWICE DAILY AS DIRECTED 200 strip 3    metFORMIN HCl 1000 MG Oral Tab Take 1 tablet (1,000 mg total) by mouth 2 (two) times daily with meals. 60 tablet 11    JARDIANCE 25 MG Oral Tab TAKE 1 TABLET BY MOUTH DAILY 30 tablet 11    LEVOTHYROXINE 75 MCG Oral Tab TAKE 1 TABLET(75 MCG) BY MOUTH BEFORE BREAKFAST 90 tablet 3    Multiple Vitamins-Minerals (WOMENS ONE DAILY) Oral Tab Take by mouth.      benzonatate 200 MG Oral Cap Take 1 capsule (200 mg total) by mouth 3 (three) times daily as needed. (Patient not taking: Reported on 3/25/2024) 30 capsule 0    pantoprazole 40 MG Oral Tab EC  Take 1 tablet (40 mg total) by mouth every morning before breakfast. (Patient not taking: Reported on 2024) 30 tablet 3      Past Medical History:   Diagnosis Date    Benign essential HTN     Diabetes (HCC)     High blood pressure     Hypothyroidism       Past Surgical History:   Procedure Laterality Date          COLONOSCOPY N/A 2021     colonoscopy done --lipoma only--needs f/u colonpscopy 10 years    REMOVAL GALLBLADDER  1998      Family History   Problem Relation Age of Onset    Other (cad) Mother     Diabetes Father     Diabetes Sister     Diabetes Brother     Glaucoma Neg     Macular degeneration Neg       Social History:   Social History     Socioeconomic History    Marital status:    Tobacco Use    Smoking status: Never     Passive exposure: Past    Smokeless tobacco: Never   Vaping Use    Vaping Use: Never used   Substance and Sexual Activity    Alcohol use: Never    Drug use: Never          REVIEW OF SYSTEMS:   MSK: +LBP. No radiculopathy.   No loss of bowel or bladder function      EXAM:   /64 (BP Location: Right arm, Patient Position: Sitting, Cuff Size: large)   Pulse 74   Temp 98.1 °F (36.7 °C) (Oral)   Ht 5' 5\" (1.651 m)   Wt 209 lb 6.4 oz (95 kg)   SpO2 97%   BMI 34.85 kg/m²     GENERAL: well developed, well nourished, in no apparent distress  MSK: neg straight leg test b/l; ttp over paraspinal area near L4-L5. Normal symmetric sensation in BLE; +2 patellar and achilles reflexes bilaterally. 5/5 strength with hip flexion, leg extension and plantarflexion in BLE      ASSESSMENT AND PLAN:     Low back pain  Take meloxicam daily x 7-10 days  Given flexeril 10mg tid x 7-10 days (avoid driving or operating heavy machinery while taking this)  Given stretches to try  Once better, can start PT  Call if no improvement    Eloise Wylie DO  3/25/2024  11:31 AM

## 2024-04-04 ENCOUNTER — TELEPHONE (OUTPATIENT)
Dept: INTERNAL MEDICINE CLINIC | Facility: CLINIC | Age: 64
End: 2024-04-04

## 2024-04-04 DIAGNOSIS — H57.89 REDNESS OF RIGHT EYE: Primary | ICD-10-CM

## 2024-04-04 NOTE — TELEPHONE ENCOUNTER
Called pt with assistance from Reji/Language line ID [id number]. Attempted calls x 2 w/o a . VM not set up and unable to leave a VM.

## 2024-04-04 NOTE — TELEPHONE ENCOUNTER
Called pt with assistance from Brenda ID # [id number] language line. Attempted calls x 2 and no pickup. VM not set up and unable to LVM

## 2024-04-04 NOTE — TELEPHONE ENCOUNTER
Called pt back in order to obtain more information regarding her eye symptoms with assistance from Luz Elena ID # [id number] Language line. Parviz attempted calls x 2 and pt did not  nor does pt have VM set up. Unable to leave a VM

## 2024-04-04 NOTE — TELEPHONE ENCOUNTER
Pt woke up this morning her right eye is red, looks like there is blood in it     Patient called requesting referral to see eye doctor Dr La   - referral is needed before pt can schedule an appointment     Dr. Joshua Franco  [address] [place] [address] [phone]    Pt speaks Faroese and call was completed with Kelvin thru the Language Line     Please call patient to advise on referral

## 2024-04-05 NOTE — TELEPHONE ENCOUNTER
To Dr. GRUBER     Spoke to patient, stated from Sunday to Monday morning she woke up with half of her right eye blood shot. Denies pain, vision changes and trauma. States that it itches when she closes it. Otherwise no other symptoms. Would like to see her eye doctor but needs a referral.    Referral for Dr. Franco pended as per patient  request if you agree

## 2024-04-16 ENCOUNTER — LAB ENCOUNTER (OUTPATIENT)
Dept: LAB | Age: 64
End: 2024-04-16
Attending: INTERNAL MEDICINE
Payer: MEDICAID

## 2024-04-16 DIAGNOSIS — E78.5 HYPERLIPIDEMIA, UNSPECIFIED HYPERLIPIDEMIA TYPE: ICD-10-CM

## 2024-04-16 DIAGNOSIS — E11.9 DIABETES MELLITUS TYPE 2 WITHOUT RETINOPATHY (HCC): ICD-10-CM

## 2024-04-16 DIAGNOSIS — E03.9 HYPOTHYROIDISM, UNSPECIFIED TYPE: ICD-10-CM

## 2024-04-16 DIAGNOSIS — Z13.0 SCREENING FOR DEFICIENCY ANEMIA: ICD-10-CM

## 2024-04-16 LAB
ALBUMIN SERPL-MCNC: 4.3 G/DL (ref 3.2–4.8)
ALBUMIN/GLOB SERPL: 1.7 {RATIO} (ref 1–2)
ALP LIVER SERPL-CCNC: 73 U/L
ALT SERPL-CCNC: 20 U/L
ANION GAP SERPL CALC-SCNC: 0 MMOL/L (ref 0–18)
AST SERPL-CCNC: 22 U/L (ref ?–34)
BASOPHILS # BLD AUTO: 0.04 X10(3) UL (ref 0–0.2)
BASOPHILS NFR BLD AUTO: 0.5 %
BILIRUB SERPL-MCNC: 0.4 MG/DL (ref 0.2–1.1)
BUN BLD-MCNC: 14 MG/DL (ref 9–23)
BUN/CREAT SERPL: 23 (ref 10–20)
CALCIUM BLD-MCNC: 10 MG/DL (ref 8.7–10.4)
CHLORIDE SERPL-SCNC: 109 MMOL/L (ref 98–112)
CHOLEST SERPL-MCNC: 104 MG/DL (ref ?–200)
CO2 SERPL-SCNC: 30 MMOL/L (ref 21–32)
CREAT BLD-MCNC: 0.61 MG/DL
DEPRECATED RDW RBC AUTO: 47.5 FL (ref 35.1–46.3)
EGFRCR SERPLBLD CKD-EPI 2021: 102 ML/MIN/1.73M2 (ref 60–?)
EOSINOPHIL # BLD AUTO: 0.13 X10(3) UL (ref 0–0.7)
EOSINOPHIL NFR BLD AUTO: 1.8 %
ERYTHROCYTE [DISTWIDTH] IN BLOOD BY AUTOMATED COUNT: 13.2 % (ref 11–15)
EST. AVERAGE GLUCOSE BLD GHB EST-MCNC: 180 MG/DL (ref 68–126)
FASTING PATIENT LIPID ANSWER: YES
FASTING STATUS PATIENT QL REPORTED: YES
GLOBULIN PLAS-MCNC: 2.5 G/DL (ref 2.8–4.4)
GLUCOSE BLD-MCNC: 136 MG/DL (ref 70–99)
HBA1C MFR BLD: 7.9 % (ref ?–5.7)
HCT VFR BLD AUTO: 41.2 %
HDLC SERPL-MCNC: 40 MG/DL (ref 40–59)
HGB BLD-MCNC: 13.2 G/DL
IMM GRANULOCYTES # BLD AUTO: 0.03 X10(3) UL (ref 0–1)
IMM GRANULOCYTES NFR BLD: 0.4 %
LDLC SERPL CALC-MCNC: 47 MG/DL (ref ?–100)
LYMPHOCYTES # BLD AUTO: 2.51 X10(3) UL (ref 1–4)
LYMPHOCYTES NFR BLD AUTO: 33.8 %
MCH RBC QN AUTO: 31.1 PG (ref 26–34)
MCHC RBC AUTO-ENTMCNC: 32 G/DL (ref 31–37)
MCV RBC AUTO: 96.9 FL
MONOCYTES # BLD AUTO: 0.6 X10(3) UL (ref 0.1–1)
MONOCYTES NFR BLD AUTO: 8.1 %
NEUTROPHILS # BLD AUTO: 4.11 X10 (3) UL (ref 1.5–7.7)
NEUTROPHILS # BLD AUTO: 4.11 X10(3) UL (ref 1.5–7.7)
NEUTROPHILS NFR BLD AUTO: 55.4 %
NONHDLC SERPL-MCNC: 64 MG/DL (ref ?–130)
OSMOLALITY SERPL CALC.SUM OF ELEC: 291 MOSM/KG (ref 275–295)
PLATELET # BLD AUTO: 293 10(3)UL (ref 150–450)
POTASSIUM SERPL-SCNC: 3.8 MMOL/L (ref 3.5–5.1)
PROT SERPL-MCNC: 6.8 G/DL (ref 5.7–8.2)
RBC # BLD AUTO: 4.25 X10(6)UL
SODIUM SERPL-SCNC: 139 MMOL/L (ref 136–145)
TRIGL SERPL-MCNC: 83 MG/DL (ref 30–149)
TSI SER-ACNC: 1.42 MIU/ML (ref 0.55–4.78)
VLDLC SERPL CALC-MCNC: 12 MG/DL (ref 0–30)
WBC # BLD AUTO: 7.4 X10(3) UL (ref 4–11)

## 2024-04-16 PROCEDURE — 85025 COMPLETE CBC W/AUTO DIFF WBC: CPT

## 2024-04-16 PROCEDURE — 83036 HEMOGLOBIN GLYCOSYLATED A1C: CPT

## 2024-04-16 PROCEDURE — 80061 LIPID PANEL: CPT

## 2024-04-16 PROCEDURE — 84443 ASSAY THYROID STIM HORMONE: CPT

## 2024-04-16 PROCEDURE — 80053 COMPREHEN METABOLIC PANEL: CPT

## 2024-04-16 PROCEDURE — 36415 COLL VENOUS BLD VENIPUNCTURE: CPT

## 2024-05-03 ENCOUNTER — OFFICE VISIT (OUTPATIENT)
Dept: INTERNAL MEDICINE CLINIC | Facility: CLINIC | Age: 64
End: 2024-05-03

## 2024-05-03 VITALS
HEIGHT: 65 IN | TEMPERATURE: 98 F | SYSTOLIC BLOOD PRESSURE: 130 MMHG | HEART RATE: 81 BPM | BODY MASS INDEX: 34.16 KG/M2 | DIASTOLIC BLOOD PRESSURE: 86 MMHG | WEIGHT: 205 LBS | OXYGEN SATURATION: 96 %

## 2024-05-03 DIAGNOSIS — E11.9 DIABETIC EYE EXAM (HCC): ICD-10-CM

## 2024-05-03 DIAGNOSIS — E03.9 HYPOTHYROIDISM, UNSPECIFIED TYPE: ICD-10-CM

## 2024-05-03 DIAGNOSIS — Z01.419 WELL WOMAN EXAM: ICD-10-CM

## 2024-05-03 DIAGNOSIS — K21.9 GASTROESOPHAGEAL REFLUX DISEASE, UNSPECIFIED WHETHER ESOPHAGITIS PRESENT: ICD-10-CM

## 2024-05-03 DIAGNOSIS — M25.552 LEFT HIP PAIN: ICD-10-CM

## 2024-05-03 DIAGNOSIS — E78.5 HYPERLIPIDEMIA, UNSPECIFIED HYPERLIPIDEMIA TYPE: ICD-10-CM

## 2024-05-03 DIAGNOSIS — Z01.00 DIABETIC EYE EXAM (HCC): ICD-10-CM

## 2024-05-03 DIAGNOSIS — E11.65 TYPE 2 DIABETES MELLITUS WITH HYPERGLYCEMIA, WITHOUT LONG-TERM CURRENT USE OF INSULIN (HCC): Primary | ICD-10-CM

## 2024-05-03 DIAGNOSIS — I10 ESSENTIAL HYPERTENSION: ICD-10-CM

## 2024-05-03 RX ORDER — DULAGLUTIDE 1.5 MG/.5ML
1.5 INJECTION, SOLUTION SUBCUTANEOUS WEEKLY
Qty: 3 ML | Refills: 3 | Status: SHIPPED | OUTPATIENT
Start: 2024-05-03 | End: 2024-06-02

## 2024-05-03 NOTE — PATIENT INSTRUCTIONS
You are seen in clinic today for diabetic follow-up.  Today, we did review your most recent set of blood work with some improvement of your sugar levels  - Lets aim for further diabetic control by increasing Trulicity to 1.5 mg injections weekly  - Continue with your other medications as these are maxed out at this time  - You may be due for diabetic eye examination, please make an appointment    May be due for well woman examination, please make an appointment with OB/GYN  -This includes Pap smear, can see Dr. Lu    The Left Hip pain is likely related to arthritis which can cause pain and sometimes weakness if there is significant arthritis to the area  - Lets obtain an x-ray of the left hip  -Would recommend initial trial of conservative therapy:    -Acetaminophen 500-650 mg every 4-6 hours as needed for pain relief  - Continue with meloxicam as needed  - We may need to do physical therapy if we still have significant weakness, pain to the area.      You may be due for: COVID dose #5, Tdap/tetanus shot, We recommended checking with your insurance for coverage of Shingrix, a 2-dose shingles vaccine that can be obtained at the pharmacy if there is adequate coverage through your insurance plan.    Return to clinic in 3-4 months for follow-up      Keep up the great work as you are doing outstanding.  Congratulations on achieving 10 pound weight loss and overall improvement of your sugar levels.  Lets see if we can continue this could progress as long as your hip remains well-controlled.      ------------------------------------------  Usted es atendido en la clínica hoy para seguimiento de diabéticos. Hoy, revisamos adams conjunto de análisis de guicho más reciente con cierta mejora en anju niveles de azúcar.  - Apuntemos a un mayor control de la diabetes aumentando Trulicity a inyecciones de 1,5 mg por semana.  - Continúe con anju otros medicamentos ya que están al jennifer en vangie momento.  - Es posible que deba  realizarse un examen ocular para diabéticos, programe chilango laura.    Es posible que sea necesario un examen de kingsley para la armando; programe chilango laura con el obstetra/ginecólogo.  -Weingarten incluye prueba de Papanicolaou, puede samantha al Dr. Lu.    El dolor en la cadera izquierda probablemente esté relacionado con la artritis, que puede causar dolor y, a veces, debilidad si hay artritis significativa en el área.  - Obtengamos chilango radiografía de la cadera izquierda.  -Recomendaría chilango prueba inicial de terapia conservadora:    -Acetaminofén 500-650 mg cada 4-6 horas según sea necesario para aliviar el dolor  - Continuar con meloxicam según sea necesario  - Es posible que necesitemos hacer fisioterapia si todavía tenemos debilidad importante, dolor en la shayy.      Es posible que deba recibir: dosis de COVID n.° 5, vacuna Tdap/tétano. Le recomendamos consultar con adams seguro la cobertura de Shingrix, chilango vacuna de 2 dosis contra la culebrilla que se puede obtener en la farmacia si existe chilango cobertura adecuada a través de adams plan de seguro.    Regreso a la clínica en 3-4 meses para seguimiento.      Continúe con el gran trabajo ya que lo está haciendo excelente. Felicitaciones por lograr chilango pérdida de peso de 10 libras y chilango mejora general de anju niveles de azúcar. Veamos si podemos continuar, esto podría progresar mientras adams cadera permanezca del controlada.

## 2024-05-03 NOTE — PROGRESS NOTES
Chief Complaint:   No chief complaint on file.    HPI:     Ms. HICKS is a 63 year old female PMHX type 2 diabetes, hypertension, hyperlipidemia, hypothyroidism, GERD who presents today for diabetic follow-up    Overall doing well. L leg pain. Ongoing 2 weeks. Started going to the gym. Pain with walking, not standing. She may not be able to walk due to the pain. She has to massage the leg and resume her leg exercises. Pain in the L inguinal area - heaviness. Comes and goes when she is working out primarily.  Has not needed medications - meloxicam.     She has lost 10 lbs since Fall.     Less fried foods, has cut down carbohydrates. Meat with sauce, spicy foods. Limits to 2 tortillas, not as much bread.    She would like to see a gynecologist for well woman examination    Past Medical History:    Benign essential HTN    Diabetes (HCC)    High blood pressure    Hypothyroidism     Past Surgical History:   Procedure Laterality Date          Colonoscopy N/A 2021     colonoscopy done --lipoma only--needs f/u colonpscopy 10 years    Removal gallbladder       Social History:  Social History     Socioeconomic History    Marital status:    Tobacco Use    Smoking status: Never     Passive exposure: Past    Smokeless tobacco: Never   Vaping Use    Vaping status: Never Used   Substance and Sexual Activity    Alcohol use: Never    Drug use: Never     Family History:  Family History   Problem Relation Age of Onset    Other (cad) Mother     Diabetes Father     Diabetes Sister     Diabetes Brother     Glaucoma Neg     Macular degeneration Neg      Allergies:  No Known Allergies  Current Meds:  Current Outpatient Medications   Medication Sig Dispense Refill    benzonatate 200 MG Oral Cap Take 1 capsule (200 mg total) by mouth 3 (three) times daily as needed. 30 capsule 0    Meloxicam 15 MG Oral Tab Take 1 tablet (15 mg total) by mouth daily. 90 tablet 3    pioglitazone 45 MG Oral Tab Take 1 tablet (45 mg  total) by mouth daily. 90 tablet 3    hydroCHLOROthiazide 25 MG Oral Tab Take 1 tablet (25 mg total) by mouth daily. 90 tablet 3    Dulaglutide (TRULICITY) 0.75 MG/0.5ML Subcutaneous Solution Pen-injector Inject 0.75 mg into the skin once a week. 4 each 6    rosuvastatin 10 MG Oral Tab Take 1 tablet (10 mg total) by mouth nightly. 90 tablet 3    losartan 50 MG Oral Tab Take 1 tablet (50 mg total) by mouth daily. 90 tablet 3    pantoprazole 40 MG Oral Tab EC Take 1 tablet (40 mg total) by mouth every morning before breakfast. 30 tablet 3    Glucose Blood (ONETOUCH ULTRA) In Vitro Strip TEST TWICE DAILY AS DIRECTED 200 strip 3    metFORMIN HCl 1000 MG Oral Tab Take 1 tablet (1,000 mg total) by mouth 2 (two) times daily with meals. 60 tablet 11    JARDIANCE 25 MG Oral Tab TAKE 1 TABLET BY MOUTH DAILY 30 tablet 11    LEVOTHYROXINE 75 MCG Oral Tab TAKE 1 TABLET(75 MCG) BY MOUTH BEFORE BREAKFAST 90 tablet 3    Multiple Vitamins-Minerals (WOMENS ONE DAILY) Oral Tab Take by mouth.        Counseling given: Not Answered       REVIEW OF SYSTEMS:   Positive Findings indicated in BOLD    Constitutional: Fever, Chills, Weight Gain, Weight Loss, Night Sweats, Fatigue, Malaise  ENT/Mouth:  Hearing Changes, Ear Pain, Nasal Congestion, Sinus Pain, Hoarseness, Sore throat, Rhinorrhea, Swallowing Difficulty  Eyes: Eye Pain, Swelling, Redness, Foreign Body, Discharge, Vision Changes  Cardiovascular: Chest Pain, SOB, PND, Dyspnea on Exertion, Orthopnea, Claudication, Edema, Palpitations  Respiratory: Cough, Sputum, Wheezing, Shortness of breath  Gastrointestinal: Nausea, Vomiting, Diarrhea, Constipation, Pain, Heartburn, Dysphagia, Bloody stools, Tarry stools  Genitourinary: Dysmenorrhea, Dysuria, Urinary Frequency, Hematuria, Urinary Incontinence, Urgency,  Flank Pain  Musculoskeletal: Arthralgias, Myalgias, Joint Swelling, Joint Stiffness, Back Pain, Neck Pain  Integumentary: Skin Lesions, Pruritis, Hair Changes, Jaundice, Nail  changes  Neuro: Weakness, Numbness, Paresthesias, Loss of Consciousness, Syncope, Dizziness, Headache, Falls  Psych: Anxiety, Depression, Insomnia, Suicidal Ideation, Homicidal ideation, Memory Changes  Heme/Lymph: Bruising, Bleeding, Lymphadenopathy  Endocrine: Polyuria, Polydipsia, Temperature Intolerance    EXAM:   Vital Signs:  Blood pressure 130/86, pulse 81, temperature 97.9 °F (36.6 °C), height 5' 5\" (1.651 m), weight 205 lb (93 kg), SpO2 96%, not currently breastfeeding.     Constitutional: No acute distress. Alert and oriented x 3.  Eyes: EOMI, PERRLA, clear sclera b/l  HENT: NCAT, Moist mucous membranes, Oropharynx without erythema or exudates  Cardiovascular: S1, S2, no S3, no S4, Regular rate and rhythm, No murmurs/gallops/rubs.   Respiratory: Clear to auscultation bilaterally.  No wheezes/rales/rhonchi  Gastrointestinal: Soft, nontender, nondistended. Positive bowel sounds x 4. No rebound tenderness. No hepatomegaly, No splenomegaly  Genitourinary: No CVA tenderness bilaterally  Neurologic: No focal neurological deficits, CN II-XII intact, light touch intact,  Musculoskeletal: Full range of motion of all extremities, no clubbing/swelling/edema  +FADIR test positive L   Skin: No lesions, No erythema, no jaundice, Cap Refill < 2s  Psychiatric: Appropriate mood and affect  Heme/Lymph/Immune: No cervical LAD          DATA REVIEWED   Labs:  Recent Results (from the past 8760 hour(s))   Comp Metabolic Panel (14)    Collection Time: 04/16/24  8:59 AM   Result Value Ref Range    Glucose 136 (H) 70 - 99 mg/dL    Sodium 139 136 - 145 mmol/L    Potassium 3.8 3.5 - 5.1 mmol/L    Chloride 109 98 - 112 mmol/L    CO2 30.0 21.0 - 32.0 mmol/L    Anion Gap 0 0 - 18 mmol/L    BUN 14 9 - 23 mg/dL    Creatinine 0.61 0.55 - 1.02 mg/dL    BUN/CREA Ratio 23.0 (H) 10.0 - 20.0    Calcium, Total 10.0 8.7 - 10.4 mg/dL    Calculated Osmolality 291 275 - 295 mOsm/kg    eGFR-Cr 102 >=60 mL/min/1.73m2    ALT 20 10 - 49 U/L    AST 22  <=34 U/L    Alkaline Phosphatase 73 50 - 130 U/L    Bilirubin, Total 0.4 0.2 - 1.1 mg/dL    Total Protein 6.8 5.7 - 8.2 g/dL    Albumin 4.3 3.2 - 4.8 g/dL    Globulin  2.5 (L) 2.8 - 4.4 g/dL    A/G Ratio 1.7 1.0 - 2.0    Patient Fasting for CMP? Yes      *Note: Due to a large number of results and/or encounters for the requested time period, some results have not been displayed. A complete set of results can be found in Results Review.       Recent Results (from the past 8760 hour(s))   CBC W/ DIFFERENTIAL    Collection Time: 04/16/24  8:59 AM   Result Value Ref Range    WBC 7.4 4.0 - 11.0 x10(3) uL    RBC 4.25 3.80 - 5.30 x10(6)uL    HGB 13.2 12.0 - 16.0 g/dL    HCT 41.2 35.0 - 48.0 %    MCV 96.9 80.0 - 100.0 fL    MCH 31.1 26.0 - 34.0 pg    MCHC 32.0 31.0 - 37.0 g/dL    RDW-SD 47.5 (H) 35.1 - 46.3 fL    RDW 13.2 11.0 - 15.0 %    .0 150.0 - 450.0 10(3)uL    Neutrophil Absolute Prelim 4.11 1.50 - 7.70 x10 (3) uL    Neutrophil Absolute 4.11 1.50 - 7.70 x10(3) uL    Lymphocyte Absolute 2.51 1.00 - 4.00 x10(3) uL    Monocyte Absolute 0.60 0.10 - 1.00 x10(3) uL    Eosinophil Absolute 0.13 0.00 - 0.70 x10(3) uL    Basophil Absolute 0.04 0.00 - 0.20 x10(3) uL    Immature Granulocyte Absolute 0.03 0.00 - 1.00 x10(3) uL    Neutrophil % 55.4 %    Lymphocyte % 33.8 %    Monocyte % 8.1 %    Eosinophil % 1.8 %    Basophil % 0.5 %    Immature Granulocyte % 0.4 %     *Note: Due to a large number of results and/or encounters for the requested time period, some results have not been displayed. A complete set of results can be found in Results Review.           Imaging:  Hand x-ray 7/31/2023    Impression   CONCLUSION: Mild narrowing throughout the DIP and PIP joints of the bilateral hands.  No erosions       Mammogram 9/18/2023    Impression   CONCLUSION:   There is no mammographic evidence of malignancy in either breast. As long as patient's clinical breast exam remains unchanged, annual screening mammogram is  recommended.     BI-RADS CATEGORY:    DIAGNOSTIC CATEGORY 1--NEGATIVE ASSESSMENT.            ASSESSMENT AND PLAN:     Left leg pain   Ongoing the last 2 weeks. May be related to recent increase activity.   - FADIR test positive, possibly related to acetabular involvement.  Consider osteoarthritis,  -Would recommend initial trial of conservative therapy:    -Acetaminophen 500-650 mg every 4-6 hours as needed for pain relief  - meloxicam, without concomitant use of any other NSAIDs  - May need to perform a trial of physical therapy.      Type 2 diabetes:   Recent A1c:   HEMOGLOBIN A1C (%)   Date Value   02/02/2024 8.2 (A)     HgbA1C (%)   Date Value   04/16/2024 7.9 (H)     Recent urine microalbumin: No components found for: \"URINEMICROALBUMIN\"  Current medications: Trulicity 0.75 mg weekly, pioglitazone 45 mg daily, metformin 1000 mg twice a day, Jardiance 25 mg daily  Eye exam: May be due for eye exam soon  Foot exam: Check feet daily  - Interested in getting sugars down further.  Provided resources and how best to improve nutrition.  A1c starting to downtrend  - Discussed increasing Trulicity as we have maximized on the other medications.  Increase Trulicity to 1.5 mg weekly    Hypertension  -Blood pressures remain at goal  - Continue checking blood pressures at home   - on losartan 50 mg daily, hydrochlorothiazide 25 mg daily    Hyperlipidemia  - Cholesterol levels within good control  - On rosuvastatin 10 mg daily    Hypothyroidism  - Last set of TFTs within normal limits  - On levothyroxine 75 mcg once a day    GERD  - Try to avoid triggering factors  - Protonix 40 mg daily    Trigger finger  Bilateral hand pain refractory to occupational therapy.  Did complete EMG/NCS for carpal tunnel syndrome which was negative.    - Did have trigger finger injections with orthopedic surgery.  Patient declined surgical management.    - Was referred to Dr. Copeland for second opinion.    - On meloxicam 15 mg daily  - Can continue  using Tylenol 500-650 mg every 4-6 hours as needed.  Maximum dose is less than 4000 mg in 24 hours  - Continue with home stretches and exercises         Orders This Visit:  No orders of the defined types were placed in this encounter.      Meds This Visit:  Requested Prescriptions      No prescriptions requested or ordered in this encounter       Imaging & Referrals:  OBG - INTERNAL  OPHTHALMOLOGY - INTERNAL     Health Maintenance  Due for Pap smear -referral for OB/GYN.  Due for COVID dose 5, shingles vaccine series, tetanus shot    Spent 30 minutes obtaining history, evaluating patient, discussing treatment options, diet, exercise, review of available labs and radiology reports, and completing documentation.     Return to clinic in 3-4 months for follow-up    Wally Alvarado MD, 05/03/24, 12:26 PM

## 2024-05-07 ENCOUNTER — HOSPITAL ENCOUNTER (OUTPATIENT)
Dept: GENERAL RADIOLOGY | Facility: HOSPITAL | Age: 64
Discharge: HOME | End: 2024-05-07
Attending: INTERNAL MEDICINE
Payer: MEDICAID

## 2024-05-07 DIAGNOSIS — M25.552 LEFT HIP PAIN: ICD-10-CM

## 2024-05-07 PROCEDURE — 73502 X-RAY EXAM HIP UNI 2-3 VIEWS: CPT | Performed by: INTERNAL MEDICINE

## 2024-05-13 ENCOUNTER — TELEPHONE (OUTPATIENT)
Dept: INTERNAL MEDICINE CLINIC | Facility: CLINIC | Age: 64
End: 2024-05-13

## 2024-05-13 NOTE — TELEPHONE ENCOUNTER
PLease notify the patient that her X-ray from 5/7 showed no fracture nor dislocation. Mild L hip osteoarthritis    She is primarily Ukrainian speaking - may need to speak with family member    We will focus on stretches, exercises and OTC medications for now. NOtify us if no improvement.

## 2024-07-01 DIAGNOSIS — E03.9 HYPOTHYROIDISM, UNSPECIFIED TYPE: ICD-10-CM

## 2024-07-01 RX ORDER — EMPAGLIFLOZIN 25 MG/1
1 TABLET, FILM COATED ORAL DAILY
Qty: 30 TABLET | Refills: 11 | Status: SHIPPED | OUTPATIENT
Start: 2024-07-01

## 2024-07-01 RX ORDER — LEVOTHYROXINE SODIUM 0.07 MG/1
75 TABLET ORAL
Qty: 90 TABLET | Refills: 3 | Status: SHIPPED | OUTPATIENT
Start: 2024-07-01

## 2024-07-01 NOTE — TELEPHONE ENCOUNTER
Refill request is for a maintenance medication and has met the criteria specified in the Ambulatory Medication Refill Standing Order for eligibility, visits, laboratory, alerts and was sent to the requested pharmacy.    Requested Prescriptions     Signed Prescriptions Disp Refills    Empagliflozin (JARDIANCE) 25 MG Oral Tab 30 tablet 11     Sig: Take 1 tablet by mouth daily.     Authorizing Provider: MICHAEL PALACIO     Ordering User: DALE AKERS    metFORMIN HCl 1000 MG Oral Tab 60 tablet 11     Sig: Take 1 tablet (1,000 mg total) by mouth 2 (two) times daily with meals.     Authorizing Provider: MICHAEL PALACIO     Ordering User: DALE AKERS    levothyroxine 75 MCG Oral Tab 90 tablet 3     Sig: Take 1 tablet (75 mcg total) by mouth before breakfast.     Authorizing Provider: MICHAEL PALACIO     Ordering User: DALE AKERS

## 2024-07-09 ENCOUNTER — OFFICE VISIT (OUTPATIENT)
Dept: OBGYN CLINIC | Facility: CLINIC | Age: 64
End: 2024-07-09
Payer: MEDICAID

## 2024-07-09 VITALS
DIASTOLIC BLOOD PRESSURE: 78 MMHG | SYSTOLIC BLOOD PRESSURE: 126 MMHG | BODY MASS INDEX: 34.59 KG/M2 | WEIGHT: 207.63 LBS | HEIGHT: 65 IN

## 2024-07-09 DIAGNOSIS — Z12.4 SCREENING FOR CERVICAL CANCER: ICD-10-CM

## 2024-07-09 DIAGNOSIS — Z01.419 ENCOUNTER FOR ANNUAL ROUTINE GYNECOLOGICAL EXAMINATION: Primary | ICD-10-CM

## 2024-07-09 PROCEDURE — 99386 PREV VISIT NEW AGE 40-64: CPT | Performed by: OBSTETRICS & GYNECOLOGY

## 2024-07-09 PROCEDURE — 87624 HPV HI-RISK TYP POOLED RSLT: CPT | Performed by: OBSTETRICS & GYNECOLOGY

## 2024-07-09 PROCEDURE — 88175 CYTOPATH C/V AUTO FLUID REDO: CPT | Performed by: OBSTETRICS & GYNECOLOGY

## 2024-07-09 NOTE — PROGRESS NOTES
ANNUAL GYN EXAM  [facility]    CHIEF COMPLAINT:    Chief Complaint   Patient presents with    Annual      HISTORY OF PRESENT ILLNESS:   Norma Noriega is a 64 year old female   who presents for annual well woman visit.  She is feeling well without complaints.  Denies postmenopausal bleeding. Menopause age 54.     PAST GYNECOLOGICAL HISTORY & OTHER PREVENTIVE MEDICINE  LMP: No LMP recorded. Patient is postmenopausal.  Period Duration (Days): postmenopausal at age 54 (2024 11:34 AM)  Use of Birth Control (if yes, specify type): Postmenopausal (2024 11:34 AM)  Hx Prior Abnormal Pap: No (2024 11:34 AM)  Pap Result Notes: per pt pap done about 3 years ago wnl (2024 11:34 AM)  Follow Up Recommendation: last tiffanie done 2023 wnl (2024 11:34 AM)    Complications: denies  Gravita/Parity:   Contraception: current -menopausal; Previous -   Sexually transmitted disease history:None  Number of sexual partners: current sexual partners: 1, 45yrs, Lifetime partners:   Pap history: 2 years Last pap/result:  ; history abnormals: denies  Date of last mammogram:  2023; history abnormals   Last Bone Density: ; history abnormals normal  Last Colonoscopy: 2021; history abnormals denies  Abuse history: denies  Vaginal discharge: denies  Bladder symptoms: denies    PAST MEDICAL HISTORY:   Past Medical History:    Benign essential HTN    Diabetes (HCC)    High blood pressure    Hypothyroidism        PAST SURGICAL HISTORY:   Past Surgical History:   Procedure Laterality Date          Colonoscopy N/A 2021     colonoscopy done --lipoma only--needs f/u colonpscopy 10 years    Removal gallbladder          PAST OB HISTORY:  OB History    Para Term  AB Living   3 3 3     3   SAB IAB Ectopic Multiple Live Births           3      # Outcome Date GA Lbr Jose/2nd Weight Sex Type Anes PTL Lv   3 Term      Caesarean      2 Term      Caesarean      1 Term       Caesarean          CURRENT MEDICATIONS:      Current Outpatient Medications:     Empagliflozin (JARDIANCE) 25 MG Oral Tab, Take 1 tablet by mouth daily., Disp: 30 tablet, Rfl: 11    metFORMIN HCl 1000 MG Oral Tab, Take 1 tablet (1,000 mg total) by mouth 2 (two) times daily with meals., Disp: 60 tablet, Rfl: 11    levothyroxine 75 MCG Oral Tab, Take 1 tablet (75 mcg total) by mouth before breakfast., Disp: 90 tablet, Rfl: 3    benzonatate 200 MG Oral Cap, Take 1 capsule (200 mg total) by mouth 3 (three) times daily as needed., Disp: 30 capsule, Rfl: 0    Meloxicam 15 MG Oral Tab, Take 1 tablet (15 mg total) by mouth daily., Disp: 90 tablet, Rfl: 3    pioglitazone 45 MG Oral Tab, Take 1 tablet (45 mg total) by mouth daily., Disp: 90 tablet, Rfl: 3    hydroCHLOROthiazide 25 MG Oral Tab, Take 1 tablet (25 mg total) by mouth daily., Disp: 90 tablet, Rfl: 3    rosuvastatin 10 MG Oral Tab, Take 1 tablet (10 mg total) by mouth nightly., Disp: 90 tablet, Rfl: 3    losartan 50 MG Oral Tab, Take 1 tablet (50 mg total) by mouth daily., Disp: 90 tablet, Rfl: 3    pantoprazole 40 MG Oral Tab EC, Take 1 tablet (40 mg total) by mouth every morning before breakfast., Disp: 30 tablet, Rfl: 3    Glucose Blood (ONETOUCH ULTRA) In Vitro Strip, TEST TWICE DAILY AS DIRECTED, Disp: 200 strip, Rfl: 3    Multiple Vitamins-Minerals (WOMENS ONE DAILY) Oral Tab, Take by mouth., Disp: , Rfl:     ALLERGIES:  No Known Allergies    SOCIAL HISTORY:  Social History     Socioeconomic History    Marital status:    Tobacco Use    Smoking status: Never     Passive exposure: Past    Smokeless tobacco: Never   Vaping Use    Vaping status: Never Used   Substance and Sexual Activity    Alcohol use: Never    Drug use: Never    Sexual activity: Not Currently     Partners: Male   Other Topics Concern    Blood Transfusions No       FAMILY HISTORY:  Family History   Problem Relation Age of Onset    Diabetes Father     Other (cad) Mother     Diabetes Sister      Diabetes Brother     Glaucoma Neg     Macular degeneration Neg     Uterine Cancer Neg     Breast Cancer Neg     Ovarian Cancer Neg      ASSESSMENTS:  REVIEW OF SYSTEMS:  CONSTITUTIONAL:  negative for fevers, chills and sweats    EYES:  negative for  blurred vision and visual disturbance  RESPIRATORY:  negative for  cough and shortness of breath  CARDIOVASCULAR:  negative for  chest pain, palpitations  GASTROINTESTINAL:  No constipation/diarrhea, no pain  GENITOURINARY:  See History of Present Illness  INTEGUMENT/BREAST: Breast: no masses, no nipple discharge  ENDOCRINE:  negative for acne, constipation, diarrhea, cold intolerance, heat intolerance, fatigue, hair loss, weight gain and weight loss  MUSCULOSKELETAL:  negative for joint pain  NEUROLOGICAL:  negative for dizziness/lightheadedness and headaches  BEHAVIOR/PSYCH:  Negative for depressed mood, anhedonia and anxiety    PHYSICAL EXAM  No LMP recorded. Patient is postmenopausal.   Vitals:    07/09/24 1135   BP: 126/78   Weight: 207 lb 9.6 oz (94.2 kg)   Height: 65\"       CONSTITUTIONAL: Awake, alert, cooperative, no apparent distress, and appears stated age   NECK:  symmetrical, trachea midline, no adenopathy, thyroid symmetric, not enlarged   LUNGS: respiration unlabored  CARDIOVASCULAR: no peripheral edema or varicosities, skin warm and dry  ABDOMEN: Soft, non-distended, non-tender, no masses palpated    CHEST/BREASTS: Breasts symmetrical, skin without lesion(s), no nipple retraction or dimpling, no nipple discharge, no masses palpated, no axillary or supraclavicular adenopathy  GENITAL/URINARY:    External Genitalia:  General appearance; normal, Hair distribution; normal, Lesions absent   Urethral Meatus:  Lesions absent, Prolapse absent  Bladder:  Tenderness absent, Cystocele absent  Vagina:  Discharge absent, Lesions absent, Pelvic support normal  Cervix:  Lesions absent, Discharge absent, Tenderness absent  Uterus:  Size normal, Masses absent,  Tenderness absent  Adnexa:  Masses absent, Tenderness absent  Anus/Perineum:  Lesions absent    MUSCULOSKELETAL: There is no redness, warmth, or swelling of the joints.  Full range of motion noted.  Motor strength is 5 out of 5 all extremities bilaterally.  Tone is normal.  NEUROLOGIC: Patient is awake, alert and oriented to name, place and time.  Casual gait is normal.  SKIN: no bruising or bleeding and no rashes  PSYCHIATRIC: Behavior:  Appropriate  Mood:  appropriate  ASSESSMENT AND PLAN:  1. Encounter for annual routine gynecological examination      2. Screening for cervical cancer    - ThinPrep PAP Smear; Future  - Hpv Dna  High Risk , Thin Prep Collect; Future       Preventive Medicine in a 64 year old female  Health Maintenance Topics with due status: Overdue       Topic Date Due    Diabetes Care Foot Exam Never done    DTaP,Tdap,and Td Vaccines Never done    Pap Smear Never done    Zoster Vaccines Never done    COVID-19 Vaccine 09/01/2023    Diabetes Care Dilated Eye Exam 02/21/2024     Health Maintenance Topics with due status: Due Soon       Topic Date Due    Mammogram 09/18/2024       COUNSELING/EDUCATION PERFORMED:   Cervical Cancer Screening  Breast Cancer Screening - monthly self breast exam  Osteoporosis Screening  Colon Cancer screening  follow up 1-2  yr or as needed  Sherri Lu MD

## 2024-07-10 LAB — HPV E6+E7 MRNA CVX QL NAA+PROBE: NEGATIVE

## 2024-09-03 ENCOUNTER — TELEPHONE (OUTPATIENT)
Dept: INTERNAL MEDICINE CLINIC | Facility: CLINIC | Age: 64
End: 2024-09-03

## 2024-09-05 RX ORDER — EMPAGLIFLOZIN 25 MG/1
1 TABLET, FILM COATED ORAL DAILY
Qty: 30 TABLET | Refills: 11 | OUTPATIENT
Start: 2024-09-05

## 2024-09-05 NOTE — TELEPHONE ENCOUNTER
Current refill request refused due to refill is either a duplicate request or has active refills at the pharmacy.  Check previous templates.    Requested Prescriptions     Refused Prescriptions Disp Refills    Empagliflozin (JARDIANCE) 25 MG Oral Tab 30 tablet 11     Sig: Take 1 tablet by mouth daily.     Refused By: DAVIN MARTIN     Reason for Refusal: Duplicate refill request      Sent-in on 7/1/2024 with enough refills for one year.

## 2024-09-06 RX ORDER — DULAGLUTIDE 1.5 MG/.5ML
1.5 INJECTION, SOLUTION SUBCUTANEOUS
Qty: 2 ML | Refills: 5 | Status: SHIPPED | OUTPATIENT
Start: 2024-09-06

## 2024-09-06 RX ORDER — LOSARTAN POTASSIUM 50 MG/1
50 TABLET ORAL DAILY
Qty: 90 TABLET | Refills: 3 | Status: SHIPPED | OUTPATIENT
Start: 2024-09-06

## 2024-09-06 RX ORDER — LOSARTAN POTASSIUM 50 MG/1
50 TABLET ORAL DAILY
Qty: 90 TABLET | Refills: 3 | Status: CANCELLED | OUTPATIENT
Start: 2024-09-06

## 2024-09-06 RX ORDER — DULAGLUTIDE 1.5 MG/.5ML
1.5 INJECTION, SOLUTION SUBCUTANEOUS
Qty: 2 ML | Refills: 5 | Status: CANCELLED | OUTPATIENT
Start: 2024-09-06

## 2024-09-06 NOTE — TELEPHONE ENCOUNTER
To Dr.P Hossein GOLDEN - refilled losartan 50 mg daily and trulicity 1.5 mg weekly;   last OV note states trulicity 0.75 mg weekly however pt has been filling /taking 1.5 mg weekly    Refill request is for a maintenance medication and has met the criteria specified in the Ambulatory Medication Refill Standing Order for eligibility, visits, laboratory, alerts and was sent to the requested pharmacy.    Requested Prescriptions     Signed Prescriptions Disp Refills    Dulaglutide (TRULICITY) 1.5 MG/0.5ML Subcutaneous Solution Pen-injector 2 mL 5     Sig: ADMINISTER 1.5 MG UNDER THE SKIN 1 TIME A WEEK     Authorizing Provider: MICHAEL PALACIO     Ordering User: TERRENCE HUA    losartan 50 MG Oral Tab 90 tablet 3     Sig: Take 1 tablet (50 mg total) by mouth daily.     Authorizing Provider: MICHAEL PALACIO     Ordering User: TERRENCE HUA

## 2024-09-06 NOTE — TELEPHONE ENCOUNTER
Sent this morning    Current refill request refused due to refill is either a duplicate request or has active refills at the pharmacy.  Check previous templates.    Requested Prescriptions     Pending Prescriptions Disp Refills    Dulaglutide (TRULICITY) 1.5 MG/0.5ML Subcutaneous Solution Pen-injector 2 mL 5     Sig: Inject 1.5 mg into the skin.    losartan 50 MG Oral Tab 90 tablet 3     Sig: Take 1 tablet (50 mg total) by mouth daily.

## 2024-09-22 NOTE — PATIENT INSTRUCTIONS
You were seen in clinic today for follow-up.  Today, we did review your most recent set of blood work with improving sugar levels.  - Lets continue with the use of Trulicity and current medications for now  - You may be due for diabetic eye examination  - Periodically check your blood sugars at home    Periodically check her blood pressures at home    For the urinary symptoms, we are waiting back on urine tests while we finalize concern for urinary tract infection.  - In the meantime, lets start Macrobid 1 capsule twice a day for 5 days    Lets continue trying to manage the arthritis pains further:  - Can continue using Tylenol 500-650 mg every 4-6 hours as needed.  Maximum dose is less than 4000 mg in 24 hours  - Continue with home stretches and exercises  -This may be manifestation of the ongoing hip and back pain that we have been trying to manage on the last visit.  You may benefit from physical therapy to help open up the areas, strengthen the muscles as well.    Please check his blood pressure periodically and notify us if increasing    Please make an appointment for mammogram    We did place fasting blood work to be completed     We will see you for your next physical examination in 3 months.

## 2024-09-22 NOTE — PROGRESS NOTES
Chief Complaint:   Chief Complaint   Patient presents with    Checkup     3 month     HPI:     Ms. HICKS is a 64 year old female PMHX type 2 diabetes, hypertension, hyperlipidemia, hypothyroidism, GERD who presents today for diabetic follow-up    Burning sensation with going to the bathroom. Urgency of needing to go to the bathroom. Since Monday. No fevers or chills.    Pain in the back and into the ankle and L legpain. 1month onset. No specific injury. One time went to the gym and flared up. Worse with warming up but improved with the activity. Only taking the meloxicam. Leg numbing    Home BP readings 120/70-80s    Home sugars 120-130s    Past Medical History:    Benign essential HTN    Diabetes (HCC)    High blood pressure    Hypothyroidism     Past Surgical History:   Procedure Laterality Date          Colonoscopy N/A 2021     colonoscopy done --lipoma only--needs f/u colonpscopy 10 years    Removal gallbladder       Social History:  Social History     Socioeconomic History    Marital status:    Tobacco Use    Smoking status: Never     Passive exposure: Past    Smokeless tobacco: Never   Vaping Use    Vaping status: Never Used   Substance and Sexual Activity    Alcohol use: Never    Drug use: Never    Sexual activity: Not Currently     Partners: Male   Other Topics Concern    Blood Transfusions No     Family History:  Family History   Problem Relation Age of Onset    Diabetes Father     Other (cad) Mother     Diabetes Sister     Diabetes Brother     Glaucoma Neg     Macular degeneration Neg     Uterine Cancer Neg     Breast Cancer Neg     Ovarian Cancer Neg      Allergies:  No Known Allergies  Current Meds:  Current Outpatient Medications   Medication Sig Dispense Refill    Dulaglutide (TRULICITY) 1.5 MG/0.5ML Subcutaneous Solution Pen-injector ADMINISTER 1.5 MG UNDER THE SKIN 1 TIME A WEEK 2 mL 5    losartan 50 MG Oral Tab Take 1 tablet (50 mg total) by mouth daily. 90 tablet 3     Empagliflozin (JARDIANCE) 25 MG Oral Tab Take 1 tablet by mouth daily. 30 tablet 11    metFORMIN HCl 1000 MG Oral Tab Take 1 tablet (1,000 mg total) by mouth 2 (two) times daily with meals. 60 tablet 11    levothyroxine 75 MCG Oral Tab Take 1 tablet (75 mcg total) by mouth before breakfast. 90 tablet 3    Meloxicam 15 MG Oral Tab Take 1 tablet (15 mg total) by mouth daily. 90 tablet 3    pioglitazone 45 MG Oral Tab Take 1 tablet (45 mg total) by mouth daily. 90 tablet 3    hydroCHLOROthiazide 25 MG Oral Tab Take 1 tablet (25 mg total) by mouth daily. 90 tablet 3    rosuvastatin 10 MG Oral Tab Take 1 tablet (10 mg total) by mouth nightly. 90 tablet 3    Glucose Blood (ONETOUCH ULTRA) In Vitro Strip TEST TWICE DAILY AS DIRECTED 200 strip 3    Multiple Vitamins-Minerals (WOMENS ONE DAILY) Oral Tab Take by mouth.        Counseling given: Not Answered       REVIEW OF SYSTEMS:   Positive Findings indicated in BOLD    Constitutional: Fever, Chills, Weight Gain, Weight Loss, Night Sweats, Fatigue, Malaise  ENT/Mouth:  Hearing Changes, Ear Pain, Nasal Congestion, Sinus Pain, Hoarseness, Sore throat, Rhinorrhea, Swallowing Difficulty  Eyes: Eye Pain, Swelling, Redness, Foreign Body, Discharge, Vision Changes  Cardiovascular: Chest Pain, SOB, PND, Dyspnea on Exertion, Orthopnea, Claudication, Edema, Palpitations  Respiratory: Cough, Sputum, Wheezing, Shortness of breath  Gastrointestinal: Nausea, Vomiting, Diarrhea, Constipation, Pain, Heartburn, Dysphagia, Bloody stools, Tarry stools  Genitourinary: Dysmenorrhea, Dysuria, Urinary Frequency, Hematuria, Urinary Incontinence, Urgency,  Flank Pain  Musculoskeletal: Arthralgias, Myalgias, Joint Swelling, Joint Stiffness, Back Pain, Neck Pain  Integumentary: Skin Lesions, Pruritis, Hair Changes, Jaundice, Nail changes  Neuro: Weakness, Numbness, Paresthesias, Loss of Consciousness, Syncope, Dizziness, Headache, Falls  Psych: Anxiety, Depression, Insomnia, Suicidal Ideation,  Homicidal ideation, Memory Changes  Heme/Lymph: Bruising, Bleeding, Lymphadenopathy  Endocrine: Polyuria, Polydipsia, Temperature Intolerance    EXAM:   Vital Signs:  Blood pressure 120/70, pulse 64, temperature 98.1 °F (36.7 °C), temperature source Oral, height 5' 7\" (1.702 m), weight 209 lb (94.8 kg), not currently breastfeeding.     Constitutional: No acute distress. Alert and oriented x 3.  Eyes: EOMI, PERRLA, clear sclera b/l  HENT: NCAT, Moist mucous membranes, Oropharynx without erythema or exudates  Cardiovascular: S1, S2, no S3, no S4, Regular rate and rhythm, No murmurs/gallops/rubs.   Respiratory: Clear to auscultation bilaterally.  No wheezes/rales/rhonchi  Gastrointestinal: Soft, nontender, nondistended. Positive bowel sounds x 4. No rebound tenderness. No hepatomegaly, No splenomegaly  Genitourinary: No CVA tenderness bilaterally  Neurologic: No focal neurological deficits, CN II-XII intact, light touch intact,  Musculoskeletal: Full range of motion of all extremities, no clubbing/swelling/edema  +FADIR test positive L     Diabetic foot examination  - No visible ulcers, wounds  - Nails within normal appearance  - Vibration sense intact bilaterally  - Monofilament x 4 areas bilateral and symmetrical-great big toe, plantar surface, dorsal surface, lateral foot      Skin: No lesions, No erythema, no jaundice, Cap Refill < 2s  Psychiatric: Appropriate mood and affect  Heme/Lymph/Immune: No cervical LAD          DATA REVIEWED   Labs:  Recent Results (from the past 8760 hour(s))   Comp Metabolic Panel (14)    Collection Time: 04/16/24  8:59 AM   Result Value Ref Range    Glucose 136 (H) 70 - 99 mg/dL    Sodium 139 136 - 145 mmol/L    Potassium 3.8 3.5 - 5.1 mmol/L    Chloride 109 98 - 112 mmol/L    CO2 30.0 21.0 - 32.0 mmol/L    Anion Gap 0 0 - 18 mmol/L    BUN 14 9 - 23 mg/dL    Creatinine 0.61 0.55 - 1.02 mg/dL    BUN/CREA Ratio 23.0 (H) 10.0 - 20.0    Calcium, Total 10.0 8.7 - 10.4 mg/dL    Calculated  Osmolality 291 275 - 295 mOsm/kg    eGFR-Cr 102 >=60 mL/min/1.73m2    ALT 20 10 - 49 U/L    AST 22 <=34 U/L    Alkaline Phosphatase 73 50 - 130 U/L    Bilirubin, Total 0.4 0.2 - 1.1 mg/dL    Total Protein 6.8 5.7 - 8.2 g/dL    Albumin 4.3 3.2 - 4.8 g/dL    Globulin  2.5 (L) 2.8 - 4.4 g/dL    A/G Ratio 1.7 1.0 - 2.0    Patient Fasting for CMP? Yes      *Note: Due to a large number of results and/or encounters for the requested time period, some results have not been displayed. A complete set of results can be found in Results Review.       Recent Results (from the past 8760 hour(s))   CBC W/ DIFFERENTIAL    Collection Time: 04/16/24  8:59 AM   Result Value Ref Range    WBC 7.4 4.0 - 11.0 x10(3) uL    RBC 4.25 3.80 - 5.30 x10(6)uL    HGB 13.2 12.0 - 16.0 g/dL    HCT 41.2 35.0 - 48.0 %    MCV 96.9 80.0 - 100.0 fL    MCH 31.1 26.0 - 34.0 pg    MCHC 32.0 31.0 - 37.0 g/dL    RDW-SD 47.5 (H) 35.1 - 46.3 fL    RDW 13.2 11.0 - 15.0 %    .0 150.0 - 450.0 10(3)uL    Neutrophil Absolute Prelim 4.11 1.50 - 7.70 x10 (3) uL    Neutrophil Absolute 4.11 1.50 - 7.70 x10(3) uL    Lymphocyte Absolute 2.51 1.00 - 4.00 x10(3) uL    Monocyte Absolute 0.60 0.10 - 1.00 x10(3) uL    Eosinophil Absolute 0.13 0.00 - 0.70 x10(3) uL    Basophil Absolute 0.04 0.00 - 0.20 x10(3) uL    Immature Granulocyte Absolute 0.03 0.00 - 1.00 x10(3) uL    Neutrophil % 55.4 %    Lymphocyte % 33.8 %    Monocyte % 8.1 %    Eosinophil % 1.8 %    Basophil % 0.5 %    Immature Granulocyte % 0.4 %     *Note: Due to a large number of results and/or encounters for the requested time period, some results have not been displayed. A complete set of results can be found in Results Review.           Imaging:  Hand x-ray 7/31/2023    Impression   CONCLUSION: Mild narrowing throughout the DIP and PIP joints of the bilateral hands.  No erosions       Mammogram 9/18/2023    Impression   CONCLUSION:   There is no mammographic evidence of malignancy in either breast. As  long as patient's clinical breast exam remains unchanged, annual screening mammogram is recommended.     BI-RADS CATEGORY:    DIAGNOSTIC CATEGORY 1--NEGATIVE ASSESSMENT.          Left hip x-ray 5/7/4            Impression   CONCLUSION:     No acute fracture or dislocation left hip.  Mild left hip osteoarthritis.     Pap smear 7/9/2024  Specimen Adequacy    Satisfactory for evaluation.  Endocervical or metaplastic cells may not be distinguished in cases of atrophy.   General Categorization     Negative for intraepithelial lesion or malignancy   HPV High Risk mRNA    Negative       ASSESSMENT AND PLAN:     Left leg pain   L achilles tendinitis  Ongoing the last 2 weeks. May be related to recent increase activity.   - FADIR test positive, possibly related to acetabular involvement.  Consider osteoarthritis,  - X-ray left hip 5/7/2024, mild left hip osteoarthritis  -Would recommend initial trial of conservative therapy:    -Acetaminophen 500-650 mg every 4-6 hours as needed for pain relief  - meloxicam, without concomitant use of any other NSAIDs  - May need to perform a trial of physical therapy.  I suspect the back and the hip are likely contributing to the left Achilles tendinitis      Type 2 diabetes:   Recent A1c:   HEMOGLOBIN A1C (%)   Date Value   02/02/2024 8.2 (A)     HgbA1C (%)   Date Value   04/16/2024 7.9 (H)     Recent urine microalbumin: No components found for: \"URINEMICROALBUMIN\"  Current medications: Trulicity 0.75 mg weekly, pioglitazone 45 mg daily, metformin 1000 mg twice a day, Jardiance 25 mg daily  Eye exam: May be due for eye exam soon  Foot exam: Check feet daily  - Discussed increasing Trulicity as we have maximized on the other medications.  Increase Trulicity to 1.5 mg weekly    Hypertension  -Blood pressures remain at goal  - Continue checking blood pressures at home   - on losartan 50 mg daily, hydrochlorothiazide 25 mg daily    Hyperlipidemia  - Cholesterol levels within good control  - On  rosuvastatin 10 mg daily    Hypothyroidism  - Last set of TFTs within normal limits  - On levothyroxine 75 mcg once a day    GERD  - Try to avoid triggering factors  - Protonix 40 mg daily    Trigger finger  Bilateral hand pain refractory to occupational therapy.  Did complete EMG/NCS for carpal tunnel syndrome which was negative.    - Did have trigger finger injections with orthopedic surgery.  Patient declined surgical management.    - Was referred to Dr. Copeland for second opinion.    - On meloxicam 15 mg daily  - Can continue using Tylenol 500-650 mg every 4-6 hours as needed.  Maximum dose is less than 4000 mg in 24 hours  - Continue with home stretches and exercises         Orders This Visit:  No orders of the defined types were placed in this encounter.      Meds This Visit:  Requested Prescriptions      No prescriptions requested or ordered in this encounter       Imaging & Referrals:  [facility] KUNAL 2D+3D SCREENING BILAT (CPT=77067/93417)     Health Maintenance  Due for COVID dose 5, shingles vaccine series, tetanus shot    Spent 40 minutes obtaining history, evaluating patient, discussing treatment options, diet, exercise, review of available labs and radiology reports, and completing documentation.     Return to clinic in 3-4 months for annual physical examination    Wally Alvarado MD, 09/24/24, 5:25 PM

## 2024-09-24 ENCOUNTER — OFFICE VISIT (OUTPATIENT)
Dept: INTERNAL MEDICINE CLINIC | Facility: CLINIC | Age: 64
End: 2024-09-24

## 2024-09-24 VITALS
HEART RATE: 64 BPM | HEIGHT: 67 IN | DIASTOLIC BLOOD PRESSURE: 70 MMHG | WEIGHT: 209 LBS | TEMPERATURE: 98 F | SYSTOLIC BLOOD PRESSURE: 120 MMHG | BODY MASS INDEX: 32.8 KG/M2

## 2024-09-24 DIAGNOSIS — Z13.89 SCREENING FOR NEPHROPATHY: ICD-10-CM

## 2024-09-24 DIAGNOSIS — G89.29 CHRONIC MIDLINE LOW BACK PAIN WITH LEFT-SIDED SCIATICA: ICD-10-CM

## 2024-09-24 DIAGNOSIS — E03.9 HYPOTHYROIDISM, UNSPECIFIED TYPE: ICD-10-CM

## 2024-09-24 DIAGNOSIS — Z13.0 SCREENING FOR DEFICIENCY ANEMIA: ICD-10-CM

## 2024-09-24 DIAGNOSIS — E78.5 HYPERLIPIDEMIA, UNSPECIFIED HYPERLIPIDEMIA TYPE: ICD-10-CM

## 2024-09-24 DIAGNOSIS — R30.0 DYSURIA: ICD-10-CM

## 2024-09-24 DIAGNOSIS — I10 ESSENTIAL HYPERTENSION: ICD-10-CM

## 2024-09-24 DIAGNOSIS — M25.552 LEFT HIP PAIN: ICD-10-CM

## 2024-09-24 DIAGNOSIS — K21.9 GASTROESOPHAGEAL REFLUX DISEASE, UNSPECIFIED WHETHER ESOPHAGITIS PRESENT: ICD-10-CM

## 2024-09-24 DIAGNOSIS — R92.30 DENSE BREAST: ICD-10-CM

## 2024-09-24 DIAGNOSIS — Z12.31 ENCOUNTER FOR SCREENING MAMMOGRAM FOR MALIGNANT NEOPLASM OF BREAST: ICD-10-CM

## 2024-09-24 DIAGNOSIS — M54.42 CHRONIC MIDLINE LOW BACK PAIN WITH LEFT-SIDED SCIATICA: ICD-10-CM

## 2024-09-24 DIAGNOSIS — E11.65 TYPE 2 DIABETES MELLITUS WITH HYPERGLYCEMIA, WITHOUT LONG-TERM CURRENT USE OF INSULIN (HCC): Primary | ICD-10-CM

## 2024-09-24 PROCEDURE — 99215 OFFICE O/P EST HI 40 MIN: CPT | Performed by: INTERNAL MEDICINE

## 2024-09-24 RX ORDER — NITROFURANTOIN 25; 75 MG/1; MG/1
100 CAPSULE ORAL 2 TIMES DAILY
Qty: 10 CAPSULE | Refills: 0 | Status: SHIPPED | OUTPATIENT
Start: 2024-09-24 | End: 2024-09-29

## 2024-09-27 ENCOUNTER — TELEPHONE (OUTPATIENT)
Dept: INTERNAL MEDICINE CLINIC | Facility: CLINIC | Age: 64
End: 2024-09-27

## 2024-09-27 NOTE — TELEPHONE ENCOUNTER
Patient is primarily Estonian-speaking  - Please notify the patient that the urine culture did confirm UTI.  However we are on adequate antibiotics.  Should complete the full course and drink plenty of water to reduce recurrences

## 2024-11-26 ENCOUNTER — TELEPHONE (OUTPATIENT)
Dept: INTERNAL MEDICINE CLINIC | Facility: CLINIC | Age: 64
End: 2024-11-26

## 2024-11-26 NOTE — TELEPHONE ENCOUNTER
[place] to Dr. GRUBER     Spoke to patient, stated she started with pain about a week ago, reports it is the lower back toward the left side and radiates to her leg. States the pain is about an 8. Has taken meloxicam and tylenol without relief. Reports going to the gym 4 times this week and on the last day she was sitting and the pain randomly started.     Has appt to see Dr. Tejada tomorrow

## 2024-11-27 ENCOUNTER — OFFICE VISIT (OUTPATIENT)
Dept: INTERNAL MEDICINE CLINIC | Facility: CLINIC | Age: 64
End: 2024-11-27

## 2024-11-27 VITALS
SYSTOLIC BLOOD PRESSURE: 114 MMHG | BODY MASS INDEX: 32.2 KG/M2 | TEMPERATURE: 98 F | DIASTOLIC BLOOD PRESSURE: 60 MMHG | OXYGEN SATURATION: 95 % | WEIGHT: 205.19 LBS | HEIGHT: 67 IN | HEART RATE: 85 BPM

## 2024-11-27 DIAGNOSIS — M54.32 LEFT SIDED SCIATICA: Primary | ICD-10-CM

## 2024-11-27 PROCEDURE — 99213 OFFICE O/P EST LOW 20 MIN: CPT | Performed by: INTERNAL MEDICINE

## 2024-11-27 RX ORDER — METHYLPREDNISOLONE 4 MG/1
TABLET ORAL
Qty: 1 EACH | Refills: 0 | Status: SHIPPED | OUTPATIENT
Start: 2024-11-27

## 2024-11-27 NOTE — PROGRESS NOTES
Norma Noriega is a 64 year old female.  Chief Complaint   Patient presents with    Back Pain     Left lower back pain for past week that radiates down leg. Pain 7/10 left lower back and leg     HPI:     Pt notes somewhat gradual onset of pain in left buttock x the past week.  Sometimes radiates down and around to left lateral thigh.  No acute injury.  Lifts weights but does not recall pain with that activity.  Worse with standing.    Already takes meloxicam daily      Current Outpatient Medications   Medication Sig Dispense Refill    Dulaglutide (TRULICITY) 1.5 MG/0.5ML Subcutaneous Solution Pen-injector ADMINISTER 1.5 MG UNDER THE SKIN 1 TIME A WEEK 2 mL 5    losartan 50 MG Oral Tab Take 1 tablet (50 mg total) by mouth daily. 90 tablet 3    Empagliflozin (JARDIANCE) 25 MG Oral Tab Take 1 tablet by mouth daily. 30 tablet 11    metFORMIN HCl 1000 MG Oral Tab Take 1 tablet (1,000 mg total) by mouth 2 (two) times daily with meals. 60 tablet 11    levothyroxine 75 MCG Oral Tab Take 1 tablet (75 mcg total) by mouth before breakfast. 90 tablet 3    Meloxicam 15 MG Oral Tab Take 1 tablet (15 mg total) by mouth daily. 90 tablet 3    pioglitazone 45 MG Oral Tab Take 1 tablet (45 mg total) by mouth daily. 90 tablet 3    hydroCHLOROthiazide 25 MG Oral Tab Take 1 tablet (25 mg total) by mouth daily. 90 tablet 3    rosuvastatin 10 MG Oral Tab Take 1 tablet (10 mg total) by mouth nightly. 90 tablet 3    Glucose Blood (ONETOUCH ULTRA) In Vitro Strip TEST TWICE DAILY AS DIRECTED 200 strip 3    Multiple Vitamins-Minerals (WOMENS ONE DAILY) Oral Tab Take by mouth.        Past Medical History:    Benign essential HTN    Diabetes (HCC)    High blood pressure    Hypothyroidism      Social History:  Social History     Socioeconomic History    Marital status:    Tobacco Use    Smoking status: Never     Passive exposure: Past    Smokeless tobacco: Never   Vaping Use    Vaping status: Never Used   Substance and Sexual Activity     Alcohol use: Never    Drug use: Never    Sexual activity: Not Currently     Partners: Male   Other Topics Concern    Blood Transfusions No        REVIEW OF SYSTEMS:   GENERAL HEALTH: feels well otherwise  RESPIRATORY: no SOB  CARDIOVASCULAR: no chest pain/pressure  GI: no nausea, vomiting, diarrhea    Wt Readings from Last 5 Encounters:   11/27/24 205 lb 3.2 oz (93.1 kg)   09/24/24 209 lb (94.8 kg)   07/09/24 207 lb 9.6 oz (94.2 kg)   05/03/24 205 lb (93 kg)   03/25/24 209 lb 6.4 oz (95 kg)     Body mass index is 32.14 kg/m².      EXAM:   /60   Pulse 85   Temp 97.8 °F (36.6 °C)   Ht 5' 7\" (1.702 m)   Wt 205 lb 3.2 oz (93.1 kg)   SpO2 95%   BMI 32.14 kg/m²   GENERAL: well developed, well nourished, in no apparent distress  NEURO: neg SLR, motor 5/5 LLE, DTR's absent BLE's  EXTREMITIES: no LE edema; +mild point tenderness over left lower buttock; no rash    ASSESSMENT AND PLAN:     Left sciatica  -medrol dose mikayla  -lidocaine patch  -tylenol 1000mg TID prn  -call if sx persist, would consider PT    The patient indicates understanding of these issues and agrees to the plan.    Krissy Recinos MD, 11/27/24, 12:35 PM

## 2024-12-04 ENCOUNTER — TELEPHONE (OUTPATIENT)
Dept: INTERNAL MEDICINE CLINIC | Facility: CLINIC | Age: 64
End: 2024-12-04

## 2024-12-04 RX ORDER — ROSUVASTATIN CALCIUM 10 MG/1
10 TABLET, COATED ORAL NIGHTLY
Qty: 90 TABLET | Refills: 3 | Status: SHIPPED | OUTPATIENT
Start: 2024-12-04

## 2024-12-04 NOTE — TELEPHONE ENCOUNTER
To Dr. Alvarado for initial RX(s)     Previously prescribed by patient's former PCP, Dr. Coy.     Established with you on 2/2/2024.     Thank you!

## 2024-12-06 ENCOUNTER — HOSPITAL ENCOUNTER (OUTPATIENT)
Dept: MAMMOGRAPHY | Facility: HOSPITAL | Age: 64
Discharge: HOME | End: 2024-12-06
Attending: INTERNAL MEDICINE
Payer: MEDICAID

## 2024-12-06 DIAGNOSIS — R92.30 DENSE BREAST: ICD-10-CM

## 2024-12-06 DIAGNOSIS — Z12.31 ENCOUNTER FOR SCREENING MAMMOGRAM FOR MALIGNANT NEOPLASM OF BREAST: ICD-10-CM

## 2024-12-06 PROCEDURE — 77067 SCR MAMMO BI INCL CAD: CPT | Performed by: INTERNAL MEDICINE

## 2024-12-06 PROCEDURE — 77063 BREAST TOMOSYNTHESIS BI: CPT | Performed by: INTERNAL MEDICINE

## 2024-12-10 ENCOUNTER — TELEPHONE (OUTPATIENT)
Dept: INTERNAL MEDICINE CLINIC | Facility: CLINIC | Age: 64
End: 2024-12-10

## 2024-12-10 NOTE — TELEPHONE ENCOUNTER
Of note, patient is primarily Danish-speaking    Please notify the patient that the mammogram came back normal.  Repeat breast exam and mammogram recommended in 1 year

## 2025-01-08 NOTE — PATIENT INSTRUCTIONS
- You were seen in clinic for regular annual check-up. We have ordered labs for you and we will call you with the results. Please obtain the bloodwork fasting for 10**12 hours. OK to drink water the day of your blood draw.      We have the lab downstairs on the first floor.  No appointment is necessary.  Lab hours are Monday-Friday 7:30 AM to 4:45 PM.  There may be Saturday hours 7 AM-11:00 AM as well.     Otherwise you can obtain the blood tests on the weekends at the main hospital or local immediate care/urgent care within the [facility].    - Diabetes can be better controlled with a goal less than 7.0  - Please continue trying to cut down on carbohydrates not just in sweets but also in bread/grain/rice/breads  - Targeting weight loss over time can help by optimizing nutrition, targeting exercise as tolerated  - Maintain annual eye examination, please make an appointment with ophthalmology, Dr. Mckeon.  - If no improvement, may need to consider adjusting the medication regimen further    Monitor for any flareups of back pain.  Would recommend:    -Acetaminophen 500-650 mg every 4-6 hours as needed for pain relief  -Ibuprofen 200-400 mg every 8 hours as needed for anti-inflammatory and pain relief  -For more severe pain, should notify us if severe symptoms persist  -Trial of physical therapy can be considered in the future  -Will hold off on further steroids, we did add the Medrol Dosepak that was ordered on the last visit to your allergy list.    -Please continue checking your blood pressures at home and notify us if they are increasing   - please continue checking your blood sugars at home and notify us if they are increasing  - Please continue following up with OB/GYN for well woman examinations.  - Next mammogram will be due later this year  - Colonoscopy will be due in 2031  - Vaccines may be due for: COVID dose #5, tetanus shot, We recommended checking with your insurance for coverage of  Shingrix, a 2-dose shingles vaccine that can be obtained at the pharmacy if there is adequate coverage through your insurance plan.    - Please continue to eat a varied diet including recommended servings of vegetables, fruits, and low fat dairy. Minimize high saturated fats (such as fast foods) and high sugar intake (such as soda)  - We recommend 150 minutes of moderate intensity exercise (brisk walking, swimming) weekly to maintain your current weight.  Targeted weight loss will require more vigorous exercise or more than 150 minutes/week.    Return to clinic in 4 months for diabetic follow-up

## 2025-01-08 NOTE — H&P
Norma Noriega is a 64 year old female.    HPI:     Chief Complaint   Patient presents with    Checkup     4-month       Ms. NORIEGA is a 64 year old female PMHX type 2 diabetes, hypertension, hyperlipidemia, hypothyroidism, GERD who presents today for annual physical examination    Feeling well. No pain right now.    Sleep 5-6 hours. Wakes up and goes back to sleep. Sleeps better on weekends. Nighttime bathroom trips 2x.     No anxiety nor depression.     She had patches of rashes - tried cerave. It's going away. Back and legs, arms. After taking the medication. Everywhere was affected except the mid abdomen. After 2 days of the medrol dosepack,    I reviewed and updated the PMHx, FamHx, medications, allergies, and SocHx as below with the patient    OB/GYN  Last menstrual period: Postmenopausal    SocHX  - Home: with family.  - Hobbies: family, movie nights. Going to Summer out.   - Nutrition: tamales, after the holidays - fish, meat, shrimp. A little bit of everything. Veggies and fruits. 5-6 cups of water. Not any soda.   - Physical Activity: hasn't back to the gym. Back pain limited her ability to work       HISTORY:  Past Medical History:    Benign essential HTN    Diabetes (HCC)    High blood pressure    Hypothyroidism      Past Surgical History:   Procedure Laterality Date          Colonoscopy N/A 2021     colonoscopy done --lipoma only--needs f/u colonpscopy 10 years    Removal gallbladder        Family History   Problem Relation Age of Onset    Diabetes Father     Other (cad) Mother     Diabetes Sister     Diabetes Brother     Glaucoma Neg     Macular degeneration Neg     Uterine Cancer Neg     Breast Cancer Neg     Ovarian Cancer Neg       Social History:   Social History     Socioeconomic History    Marital status:    Tobacco Use    Smoking status: Never     Passive exposure: Past    Smokeless tobacco: Never   Vaping Use    Vaping status: Never Used   Substance and Sexual  Activity    Alcohol use: Never    Drug use: Never    Sexual activity: Not Currently     Partners: Male   Other Topics Concern    Blood Transfusions No        Medications (Active prior to today's visit):  Current Outpatient Medications   Medication Sig Dispense Refill    ROSUVASTATIN 10 MG Oral Tab TAKE 1 TABLET(10 MG) BY MOUTH EVERY NIGHT 90 tablet 3    Dulaglutide (TRULICITY) 1.5 MG/0.5ML Subcutaneous Solution Pen-injector ADMINISTER 1.5 MG UNDER THE SKIN 1 TIME A WEEK 2 mL 5    losartan 50 MG Oral Tab Take 1 tablet (50 mg total) by mouth daily. 90 tablet 3    Empagliflozin (JARDIANCE) 25 MG Oral Tab Take 1 tablet by mouth daily. 30 tablet 11    metFORMIN HCl 1000 MG Oral Tab Take 1 tablet (1,000 mg total) by mouth 2 (two) times daily with meals. 60 tablet 11    levothyroxine 75 MCG Oral Tab Take 1 tablet (75 mcg total) by mouth before breakfast. 90 tablet 3    Meloxicam 15 MG Oral Tab Take 1 tablet (15 mg total) by mouth daily. 90 tablet 3    pioglitazone 45 MG Oral Tab Take 1 tablet (45 mg total) by mouth daily. 90 tablet 3    hydroCHLOROthiazide 25 MG Oral Tab Take 1 tablet (25 mg total) by mouth daily. 90 tablet 3    Glucose Blood (ONETOUCH ULTRA) In Vitro Strip TEST TWICE DAILY AS DIRECTED 200 strip 3    Multiple Vitamins-Minerals (WOMENS ONE DAILY) Oral Tab Take by mouth.         Allergies:  Allergies[1]      ROS:   Positive Findings indicated in BOLD    Constitutional: Fever, Chills, Weight Gain, Weight Loss, Night Sweats, Fatigue, Malaise  ENT/Mouth:  Hearing Changes, Ear Pain, Nasal Congestion, Sinus Pain, Hoarseness, Sore throat, Rhinorrhea, Swallowing Difficulty  Eyes: Eye Pain, Swelling, Redness, Foreign Body, Discharge, Vision Changes  Cardiovascular: Chest Pain, SOB, PND, Dyspnea on Exertion, Orthopnea, Claudication, Edema, Palpitations  Respiratory: Cough, Sputum, Wheezing, Shortness of breath  Gastrointestinal: Nausea, Vomiting, Diarrhea, Constipation, Pain, Heartburn, Dysphagia, Bloody stools,  Tarry stools  Genitourinary: Dysmenorrhea, Dysuria, Urinary Frequency, Hematuria, Urinary Incontinence, Urgency,  Flank Pain  Musculoskeletal: Arthralgias, Myalgias, Joint Swelling, Joint Stiffness, Back Pain, Neck Pain  Integumentary: Skin Lesions, Pruritis, Hair Changes, Jaundice, Nail changes  Neuro: Weakness, Numbness, Paresthesias, Loss of Consciousness, Syncope, Dizziness, Headache, Falls  Psych: Anxiety, Depression, Insomnia, Suicidal Ideation, Homicidal ideation, Memory Changes  Heme/Lymph: Bruising, Bleeding, Lymphadenopathy  Endocrine: Polyuria, Polydipsia, Temperature Intolerance    PHYSICAL EXAM:   Vital Signs:  Blood pressure 118/66, pulse 71, temperature 97.7 °F (36.5 °C), temperature source Oral, height 5' 7\" (1.702 m), weight 209 lb 6.4 oz (95 kg), SpO2 98%, not currently breastfeeding.     Constitutional: No acute distress. Alert and oriented x 3.  Eyes: EOMI, PERRLA, clear sclera b/l  HENT: NCAT, Moist mucous membranes, Oropharynx without erythema or exudates  Neck: No JVD, no thyromegaly  Cardiovascular: S1, S2, no S3, no S4, Regular rate and rhythm, No murmurs/gallops/rubs.   Vascular: Equal pulses 2+ carotids no bruits or thrills/radial/DP/PT bilaterally  Respiratory: Clear to auscultation bilaterally.  No wheezes/rales/rhonchi  Gastrointestinal: Soft, nontender, nondistended. Positive bowel sounds x 4. No rebound tenderness. No hepatomegaly, No splenomegaly  Genitourinary: No CVA tenderness bilaterally  Neurologic: No focal neurological deficits, CN II-XII intact, light touch intact, MSK Strength 5/5 and symmetric in all extremities, normal gait, 2+ patellar tendon  Musculoskeletal: Full range of motion of all extremities, no clubbing/swelling/edema  Skin: No lesions, No erythema, no jaundice, Cap Refill < 2s  Psychiatric: Appropriate mood and affect  Heme/Lymph/Immune: No cervical LAD          DATA REVIEWED   Labs:  Recent Results (from the past 8760 hours)   CBC W/ DIFFERENTIAL    Collection  Time: 04/16/24  8:59 AM   Result Value Ref Range    WBC 7.4 4.0 - 11.0 x10(3) uL    RBC 4.25 3.80 - 5.30 x10(6)uL    HGB 13.2 12.0 - 16.0 g/dL    HCT 41.2 35.0 - 48.0 %    MCV 96.9 80.0 - 100.0 fL    MCH 31.1 26.0 - 34.0 pg    MCHC 32.0 31.0 - 37.0 g/dL    RDW-SD 47.5 (H) 35.1 - 46.3 fL    RDW 13.2 11.0 - 15.0 %    .0 150.0 - 450.0 10(3)uL    Neutrophil Absolute Prelim 4.11 1.50 - 7.70 x10 (3) uL    Neutrophil Absolute 4.11 1.50 - 7.70 x10(3) uL    Lymphocyte Absolute 2.51 1.00 - 4.00 x10(3) uL    Monocyte Absolute 0.60 0.10 - 1.00 x10(3) uL    Eosinophil Absolute 0.13 0.00 - 0.70 x10(3) uL    Basophil Absolute 0.04 0.00 - 0.20 x10(3) uL    Immature Granulocyte Absolute 0.03 0.00 - 1.00 x10(3) uL    Neutrophil % 55.4 %    Lymphocyte % 33.8 %    Monocyte % 8.1 %    Eosinophil % 1.8 %    Basophil % 0.5 %    Immature Granulocyte % 0.4 %     *Note: Due to a large number of results and/or encounters for the requested time period, some results have not been displayed. A complete set of results can be found in Results Review.       Recent Results (from the past 8760 hours)   Comp Metabolic Panel (14)    Collection Time: 04/16/24  8:59 AM   Result Value Ref Range    Glucose 136 (H) 70 - 99 mg/dL    Sodium 139 136 - 145 mmol/L    Potassium 3.8 3.5 - 5.1 mmol/L    Chloride 109 98 - 112 mmol/L    CO2 30.0 21.0 - 32.0 mmol/L    Anion Gap 0 0 - 18 mmol/L    BUN 14 9 - 23 mg/dL    Creatinine 0.61 0.55 - 1.02 mg/dL    BUN/CREA Ratio 23.0 (H) 10.0 - 20.0    Calcium, Total 10.0 8.7 - 10.4 mg/dL    Calculated Osmolality 291 275 - 295 mOsm/kg    eGFR-Cr 102 >=60 mL/min/1.73m2    ALT 20 10 - 49 U/L    AST 22 <=34 U/L    Alkaline Phosphatase 73 50 - 130 U/L    Bilirubin, Total 0.4 0.2 - 1.1 mg/dL    Total Protein 6.8 5.7 - 8.2 g/dL    Albumin 4.3 3.2 - 4.8 g/dL    Globulin  2.5 (L) 2.8 - 4.4 g/dL    A/G Ratio 1.7 1.0 - 2.0    Patient Fasting for CMP? Yes      *Note: Due to a large number of results and/or encounters for the  requested time period, some results have not been displayed. A complete set of results can be found in Results Review.       Cholesterol, Total (mg/dL)   Date Value   04/16/2024 104     HDL Cholesterol (mg/dL)   Date Value   04/16/2024 40     LDL Cholesterol (mg/dL)   Date Value   04/16/2024 47     Triglycerides (mg/dL)   Date Value   04/16/2024 83       Last A1c value was 7.9% done 4/16/2024.             Imaging:  Hand x-ray 7/31/2023    Impression   CONCLUSION: Mild narrowing throughout the DIP and PIP joints of the bilateral hands.  No erosions         Mammogram 9/18/2023    Impression   CONCLUSION:   There is no mammographic evidence of malignancy in either breast. As long as patient's clinical breast exam remains unchanged, annual screening mammogram is recommended.     BI-RADS CATEGORY:    DIAGNOSTIC CATEGORY 1--NEGATIVE ASSESSMENT.           Left hip x-ray 5/7/4           Impression   CONCLUSION:     No acute fracture or dislocation left hip.  Mild left hip osteoarthritis.      Pap smear 7/9/2024  Specimen Adequacy     Satisfactory for evaluation.  Endocervical or metaplastic cells may not be distinguished in cases of atrophy.   General Categorization     Negative for intraepithelial lesion or malignancy   HPV High Risk mRNA     Negative     Mammogram 12/6/2024    Impression   CONCLUSION:       No mammographic evidence of breast malignancy.  As long as clinical examination remains benign, annual screening mammogram is recommended in 1 year.       ASSESSMENT/PLAN:       Low back pain with left-sided sciatica  Last flareup 11/27, and treated with Medrol Dosepak, lidocaine patch  -Would recommend initial trial of conservative therapy:    -Acetaminophen 500-650 mg every 4-6 hours as needed for pain relief  -Ibuprofen 200-400 mg every 8 hours as needed for anti-inflammatory and pain relief  -For more severe pain, should notify us if severe symptoms persist  -Trial of physical therapy can be considered in the  future, will monitor for now.       Type 2 diabetes:   Recent A1c:   HEMOGLOBIN A1C (%)   Date Value   02/02/2024 8.2 (A)     HgbA1C (%)   Date Value   04/16/2024 7.9 (H)     Recent urine microalbumin: No components found for: \"URINEMICROALBUMIN\"  Current medications: Trulicity 0.75 mg weekly, pioglitazone 45 mg daily, metformin 1000 mg twice a day, Jardiance 25 mg daily  Eye exam: May be due for eye exam soon  Foot exam: Check feet daily  - Will replete repeat A1c, may benefit from increase Trulicity     Hypertension  -Blood pressures remain at goal  - Continue checking blood pressures at home   - on losartan 50 mg daily, hydrochlorothiazide 25 mg daily     Hyperlipidemia  - Cholesterol levels within good control  - On rosuvastatin 10 mg daily     Hypothyroidism  - Last set of TFTs within normal limits  - On levothyroxine 75 mcg once a day     GERD  - Try to avoid triggering factors  - Protonix 40 mg daily     Trigger finger  Bilateral hand pain refractory to occupational therapy.  Did complete EMG/NCS for carpal tunnel syndrome which was negative.    - Did have trigger finger injections with orthopedic surgery.  Patient declined surgical management.    - Was referred to Dr. Copeland for second opinion.    - On meloxicam 15 mg daily  - Can continue using Tylenol 500-650 mg every 4-6 hours as needed.  Maximum dose is less than 4000 mg in 24 hours  - Continue with home stretches and exercises         Orders This Visit:  No orders of the defined types were placed in this encounter.      Meds This Visit:  Requested Prescriptions      No prescriptions requested or ordered in this encounter       Imaging & Referrals:  None       Health Maintenance  HTN Screen: At goal  DM Screen: As above  HLD Screen: As above  Osteoporosis Screen (>65 or < 65 with FRAX > 9.3%): Not indicated  HCV Screen: Considered low risk  HIV Screen: considered low risk  G/C/Syphilis: Considered low risk    Colon Cancer Screening (45-70): Up-to-date,  next due 2031  Breast Cancer Screening (40-70): Up-to-date  Cervical Cancer Screening (21-64): Up-to-date, next due 2027  Lung Cancer Screening (55-79 with 30 p/year and active < 15 years): Not indicated    Influenza: Annually  Td/Tdap: Last Tdap-unknown  Zoster (50+): Recommended that patient check with insurance company for coverage, can obtain 2 doses at the pharmacy  HPV (19-26): Not indicated  Pneumococcal: Up-to-date    Immunization History   Administered Date(s) Administered    Covid-19 Vaccine Pfizer 30 mcg/0.3 ml 03/10/2021, 03/31/2021, 02/09/2022    Covid-19 Vaccine Pfizer Judson-Sucrose 30 mcg/0.3 ml 02/09/2022    FLULAVAL 6 months & older 0.5 ml Prefilled syringe (66704) 10/28/2021, 12/01/2022    FLUZONE 6 months and older PFS 0.5 ml (93391) 10/07/2016, 01/19/2018, 10/19/2023    Flucelvax 0.5 Ml Quad PFS Single Dose 09/08/2020    Flucelvax Influenza vaccine, trivalent (ccIIV3), 0.5mL IM 10/27/2019, 09/08/2020    Influenza 10/27/2019, 09/08/2020    Pneumococcal Conjugate PCV20 10/19/2023    Pneumovax 23 02/08/2021         Return to clinic in 3-4 months for diabetic follow-up    Wally Alvarado MD, 01/09/25, 8:13 PM           [1] No Known Allergies

## 2025-01-09 ENCOUNTER — OFFICE VISIT (OUTPATIENT)
Dept: INTERNAL MEDICINE CLINIC | Facility: CLINIC | Age: 65
End: 2025-01-09

## 2025-01-09 VITALS
BODY MASS INDEX: 32.86 KG/M2 | OXYGEN SATURATION: 98 % | TEMPERATURE: 98 F | SYSTOLIC BLOOD PRESSURE: 118 MMHG | DIASTOLIC BLOOD PRESSURE: 66 MMHG | WEIGHT: 209.38 LBS | HEIGHT: 67 IN | HEART RATE: 71 BPM

## 2025-01-09 DIAGNOSIS — E11.65 TYPE 2 DIABETES MELLITUS WITH HYPERGLYCEMIA, WITHOUT LONG-TERM CURRENT USE OF INSULIN (HCC): ICD-10-CM

## 2025-01-09 DIAGNOSIS — G89.29 CHRONIC MIDLINE LOW BACK PAIN WITH LEFT-SIDED SCIATICA: ICD-10-CM

## 2025-01-09 DIAGNOSIS — M54.42 CHRONIC MIDLINE LOW BACK PAIN WITH LEFT-SIDED SCIATICA: ICD-10-CM

## 2025-01-09 DIAGNOSIS — Z12.31 ENCOUNTER FOR SCREENING MAMMOGRAM FOR MALIGNANT NEOPLASM OF BREAST: ICD-10-CM

## 2025-01-09 DIAGNOSIS — R92.30 DENSE BREAST: ICD-10-CM

## 2025-01-09 DIAGNOSIS — Z13.5 SCREENING FOR DIABETIC RETINOPATHY: ICD-10-CM

## 2025-01-09 DIAGNOSIS — I10 ESSENTIAL HYPERTENSION: ICD-10-CM

## 2025-01-09 DIAGNOSIS — E03.9 HYPOTHYROIDISM, UNSPECIFIED TYPE: ICD-10-CM

## 2025-01-09 DIAGNOSIS — E78.5 HYPERLIPIDEMIA, UNSPECIFIED HYPERLIPIDEMIA TYPE: ICD-10-CM

## 2025-01-09 DIAGNOSIS — K21.9 GASTROESOPHAGEAL REFLUX DISEASE, UNSPECIFIED WHETHER ESOPHAGITIS PRESENT: ICD-10-CM

## 2025-01-09 DIAGNOSIS — Z00.00 ANNUAL PHYSICAL EXAM: Primary | ICD-10-CM

## 2025-01-09 DIAGNOSIS — Z13.89 SCREENING FOR NEPHROPATHY: ICD-10-CM

## 2025-01-09 DIAGNOSIS — Z13.0 SCREENING FOR DEFICIENCY ANEMIA: ICD-10-CM

## 2025-01-09 PROCEDURE — 99396 PREV VISIT EST AGE 40-64: CPT | Performed by: INTERNAL MEDICINE

## 2025-01-17 ENCOUNTER — LAB ENCOUNTER (OUTPATIENT)
Dept: LAB | Age: 65
End: 2025-01-17
Attending: INTERNAL MEDICINE
Payer: MEDICAID

## 2025-01-17 DIAGNOSIS — E03.9 HYPOTHYROIDISM, UNSPECIFIED TYPE: ICD-10-CM

## 2025-01-17 DIAGNOSIS — Z13.0 SCREENING FOR DEFICIENCY ANEMIA: ICD-10-CM

## 2025-01-17 DIAGNOSIS — E11.65 TYPE 2 DIABETES MELLITUS WITH HYPERGLYCEMIA, WITHOUT LONG-TERM CURRENT USE OF INSULIN (HCC): ICD-10-CM

## 2025-01-17 DIAGNOSIS — E78.5 HYPERLIPIDEMIA, UNSPECIFIED HYPERLIPIDEMIA TYPE: ICD-10-CM

## 2025-01-17 DIAGNOSIS — Z13.89 SCREENING FOR NEPHROPATHY: ICD-10-CM

## 2025-01-17 LAB
ALBUMIN SERPL-MCNC: 4.7 G/DL (ref 3.2–4.8)
ALBUMIN/GLOB SERPL: 2 {RATIO} (ref 1–2)
ALP LIVER SERPL-CCNC: 72 U/L
ALT SERPL-CCNC: 19 U/L
ANION GAP SERPL CALC-SCNC: 9 MMOL/L (ref 0–18)
AST SERPL-CCNC: 28 U/L (ref ?–34)
BASOPHILS # BLD AUTO: 0.03 X10(3) UL (ref 0–0.2)
BASOPHILS NFR BLD AUTO: 0.5 %
BILIRUB SERPL-MCNC: 0.6 MG/DL (ref 0.2–1.1)
BUN BLD-MCNC: 20 MG/DL (ref 9–23)
BUN/CREAT SERPL: 26.3 (ref 10–20)
CALCIUM BLD-MCNC: 10.1 MG/DL (ref 8.7–10.4)
CHLORIDE SERPL-SCNC: 101 MMOL/L (ref 98–112)
CHOLEST SERPL-MCNC: 126 MG/DL (ref ?–200)
CO2 SERPL-SCNC: 29 MMOL/L (ref 21–32)
CREAT BLD-MCNC: 0.76 MG/DL
CREAT UR-SCNC: 64.1 MG/DL
DEPRECATED RDW RBC AUTO: 49.4 FL (ref 35.1–46.3)
EGFRCR SERPLBLD CKD-EPI 2021: 89 ML/MIN/1.73M2 (ref 60–?)
EOSINOPHIL # BLD AUTO: 0.13 X10(3) UL (ref 0–0.7)
EOSINOPHIL NFR BLD AUTO: 2.2 %
ERYTHROCYTE [DISTWIDTH] IN BLOOD BY AUTOMATED COUNT: 13.8 % (ref 11–15)
EST. AVERAGE GLUCOSE BLD GHB EST-MCNC: 183 MG/DL (ref 68–126)
FASTING PATIENT LIPID ANSWER: YES
FASTING STATUS PATIENT QL REPORTED: YES
GLOBULIN PLAS-MCNC: 2.4 G/DL (ref 2–3.5)
GLUCOSE BLD-MCNC: 122 MG/DL (ref 70–99)
HBA1C MFR BLD: 8 % (ref ?–5.7)
HCT VFR BLD AUTO: 41.4 %
HDLC SERPL-MCNC: 56 MG/DL (ref 40–59)
HGB BLD-MCNC: 13.5 G/DL
IMM GRANULOCYTES # BLD AUTO: 0.02 X10(3) UL (ref 0–1)
IMM GRANULOCYTES NFR BLD: 0.3 %
LDLC SERPL CALC-MCNC: 57 MG/DL (ref ?–100)
LYMPHOCYTES # BLD AUTO: 1.93 X10(3) UL (ref 1–4)
LYMPHOCYTES NFR BLD AUTO: 33.1 %
MCH RBC QN AUTO: 31.3 PG (ref 26–34)
MCHC RBC AUTO-ENTMCNC: 32.6 G/DL (ref 31–37)
MCV RBC AUTO: 96.1 FL
MICROALBUMIN UR-MCNC: <0.3 MG/DL
MONOCYTES # BLD AUTO: 0.54 X10(3) UL (ref 0.1–1)
MONOCYTES NFR BLD AUTO: 9.3 %
NEUTROPHILS # BLD AUTO: 3.18 X10 (3) UL (ref 1.5–7.7)
NEUTROPHILS # BLD AUTO: 3.18 X10(3) UL (ref 1.5–7.7)
NEUTROPHILS NFR BLD AUTO: 54.6 %
NONHDLC SERPL-MCNC: 70 MG/DL (ref ?–130)
OSMOLALITY SERPL CALC.SUM OF ELEC: 292 MOSM/KG (ref 275–295)
PLATELET # BLD AUTO: 239 10(3)UL (ref 150–450)
POTASSIUM SERPL-SCNC: 4 MMOL/L (ref 3.5–5.1)
PROT SERPL-MCNC: 7.1 G/DL (ref 5.7–8.2)
RBC # BLD AUTO: 4.31 X10(6)UL
SODIUM SERPL-SCNC: 139 MMOL/L (ref 136–145)
TRIGL SERPL-MCNC: 62 MG/DL (ref 30–149)
TSI SER-ACNC: 1.23 UIU/ML (ref 0.55–4.78)
VLDLC SERPL CALC-MCNC: 9 MG/DL (ref 0–30)
WBC # BLD AUTO: 5.8 X10(3) UL (ref 4–11)

## 2025-01-17 PROCEDURE — 85025 COMPLETE CBC W/AUTO DIFF WBC: CPT

## 2025-01-17 PROCEDURE — 80061 LIPID PANEL: CPT

## 2025-01-17 PROCEDURE — 84443 ASSAY THYROID STIM HORMONE: CPT

## 2025-01-17 PROCEDURE — 83036 HEMOGLOBIN GLYCOSYLATED A1C: CPT

## 2025-01-17 PROCEDURE — 82043 UR ALBUMIN QUANTITATIVE: CPT

## 2025-01-17 PROCEDURE — 36415 COLL VENOUS BLD VENIPUNCTURE: CPT

## 2025-01-17 PROCEDURE — 80053 COMPREHEN METABOLIC PANEL: CPT

## 2025-01-17 PROCEDURE — 82570 ASSAY OF URINE CREATININE: CPT

## 2025-01-22 ENCOUNTER — TELEPHONE (OUTPATIENT)
Dept: INTERNAL MEDICINE CLINIC | Facility: CLINIC | Age: 65
End: 2025-01-22

## 2025-01-22 RX ORDER — DULAGLUTIDE 1.5 MG/.5ML
0.5 INJECTION, SOLUTION SUBCUTANEOUS WEEKLY
Qty: 2 ML | Refills: 3 | Status: SHIPPED | OUTPATIENT
Start: 2025-01-22

## 2025-01-22 NOTE — TELEPHONE ENCOUNTER
Of note, patient is primarily Amharic-speaking.  May need to speak to daughter    Please inform patient that sugar levels show A1c 8.0%, there is improvement for sugar control as we can try to get it down below 7.0    All the other labs look good, namely normal cholesterol levels, blood counts, thyroid.    I would like her to increase the dose of her Trulicity from 0.75 mg to 1.5 mg injection weekly.  Okay to complete the current prescription of Trulicity and then  the new prescription when able.

## 2025-01-22 NOTE — TELEPHONE ENCOUNTER
Called patient assisted by Canelo Vincentian speaking  ID # [id number] and relayed Dr GRUBER message.    Patient voiced understanding  of Dr GRUBER message to Canelo    Patient will add her daughter to her HIPAA form at next office visit.

## 2025-01-27 RX ORDER — MELOXICAM 15 MG/1
15 TABLET ORAL DAILY
Qty: 90 TABLET | Refills: 3 | Status: SHIPPED | OUTPATIENT
Start: 2025-01-27

## 2025-01-27 NOTE — TELEPHONE ENCOUNTER
Refill request is for a maintenance medication and has met the criteria specified in the Ambulatory Medication Refill Standing Order for eligibility, visits, laboratory, alerts and was sent to the requested pharmacy.    Requested Prescriptions     Signed Prescriptions Disp Refills    MELOXICAM 15 MG Oral Tab 90 tablet 3     Sig: TAKE 1 TABLET(15 MG) BY MOUTH DAILY     Authorizing Provider: MICHAEL PALACIO     Ordering User: DAVIN MARTIN

## 2025-02-24 ENCOUNTER — TELEPHONE (OUTPATIENT)
Dept: INTERNAL MEDICINE CLINIC | Facility: CLINIC | Age: 65
End: 2025-02-24

## 2025-02-24 NOTE — TELEPHONE ENCOUNTER
Mady faxed refill request for     Nateglinide 120 MG   Take one tablet by mouth 3 X daily with meals  Qty 270  Last refill 12/312023

## 2025-03-17 RX ORDER — HYDROCHLOROTHIAZIDE 25 MG/1
25 TABLET ORAL DAILY
Qty: 90 TABLET | Refills: 3 | Status: SHIPPED | OUTPATIENT
Start: 2025-03-17

## 2025-03-17 NOTE — TELEPHONE ENCOUNTER
Refill request is for a maintenance medication and has met the criteria specified in the Ambulatory Medication Refill Standing Order for eligibility, visits, laboratory, alerts and was sent to the requested pharmacy.    Requested Prescriptions     Signed Prescriptions Disp Refills    HYDROCHLOROTHIAZIDE 25 MG Oral Tab 90 tablet 3     Sig: TAKE 1 TABLET(25 MG) BY MOUTH DAILY     Authorizing Provider: MICHAEL PALACIO     Ordering User: DALE AKERS

## 2025-04-21 ENCOUNTER — TELEPHONE (OUTPATIENT)
Dept: INTERNAL MEDICINE CLINIC | Facility: CLINIC | Age: 65
End: 2025-04-21

## 2025-04-21 NOTE — TELEPHONE ENCOUNTER
Patient's daughter Fiona is calling for the last 22 days patient has been experiencing left knee pain.    Patient is asking to be seen, there are no openings can she be added?    Please call [phone]

## 2025-04-21 NOTE — TELEPHONE ENCOUNTER
I spoke to Fiona who was with the patient. Patient complaining of left knee pain and swelling for the last 22 days, not getting worse but not going away either. Denies redness or any injury/fall. Patient will see  tomorrow for evaluation.

## 2025-04-22 ENCOUNTER — OFFICE VISIT (OUTPATIENT)
Dept: INTERNAL MEDICINE CLINIC | Facility: CLINIC | Age: 65
End: 2025-04-22

## 2025-04-22 VITALS
OXYGEN SATURATION: 97 % | SYSTOLIC BLOOD PRESSURE: 118 MMHG | WEIGHT: 215 LBS | HEART RATE: 65 BPM | BODY MASS INDEX: 33.74 KG/M2 | HEIGHT: 67 IN | TEMPERATURE: 98 F | DIASTOLIC BLOOD PRESSURE: 64 MMHG

## 2025-04-22 DIAGNOSIS — M25.562 MEDIAL KNEE PAIN, LEFT: Primary | ICD-10-CM

## 2025-04-22 PROCEDURE — 99213 OFFICE O/P EST LOW 20 MIN: CPT | Performed by: INTERNAL MEDICINE

## 2025-04-22 RX ORDER — MELOXICAM 15 MG/1
15 TABLET ORAL DAILY
Qty: 30 TABLET | Refills: 11 | Status: CANCELLED | OUTPATIENT
Start: 2025-04-22

## 2025-04-22 NOTE — PROGRESS NOTES
Norma Noriega is a 64 year old female.  Chief Complaint   Patient presents with    Checkup     HPI:     Pt notes 22 days of left medial knee pain and swelling.  The night before the pain started, she went dancing, and felt fine while doing so.  The next day, her knee swelled up and she developed pain medially.  She tried icing it once.  Since then the knee has continued to be somewhat swollen and painful.  Her knee has given out on her a few times.    Taking ES tylenol and meloxicam and advil (on a different day); helped only slightly    Current Medications[1]   Past Medical History[2]   Social History:  Short Social Hx on File[3]     REVIEW OF SYSTEMS:   GENERAL HEALTH: feels well otherwise  RESPIRATORY: no SOB  CARDIOVASCULAR: no chest pain/pressure  GI: no nausea, vomiting, diarrhea    Wt Readings from Last 5 Encounters:   04/22/25 215 lb (97.5 kg)   01/09/25 209 lb 6.4 oz (95 kg)   11/27/24 205 lb 3.2 oz (93.1 kg)   09/24/24 209 lb (94.8 kg)   07/09/24 207 lb 9.6 oz (94.2 kg)     Body mass index is 33.67 kg/m².      EXAM:   /64   Pulse 65   Temp 97.7 °F (36.5 °C)   Ht 5' 7\" (1.702 m)   Wt 215 lb (97.5 kg)   LMP  (LMP Unknown)   SpO2 97%   BMI 33.67 kg/m²   GENERAL: well developed, well nourished, in no apparent distress    EXTREMITIES (left knee): +mild left knee swelling.  +medial tenderness.  No erythema or warmth.  Knee with full ROM, no signif crepitus.  +mild medial knee pain with valgus stress    ASSESSMENT AND PLAN:     Left medial knee pain  -started the day after prolonged dancing  -persistent pain, some swelling; knee gives out periodically  -poss meniscal tear?  -rec daily meloxicam, tylenol 1000mg TID prn, lidocaine patch  -rec compression sleeve  -refer to ortho Dr. Bella    The patient indicates understanding of these issues and agrees to the plan.    Krissy Recinos MD, 04/22/25, 5:33 PM       [1]   Current Outpatient Medications   Medication Sig Dispense Refill     HYDROCHLOROTHIAZIDE 25 MG Oral Tab TAKE 1 TABLET(25 MG) BY MOUTH DAILY 90 tablet 3    MELOXICAM 15 MG Oral Tab TAKE 1 TABLET(15 MG) BY MOUTH DAILY 90 tablet 3    Dulaglutide (TRULICITY) 1.5 MG/0.5ML Subcutaneous Solution Auto-injector Inject 0.5 mL into the skin once a week. 2 mL 3    ROSUVASTATIN 10 MG Oral Tab TAKE 1 TABLET(10 MG) BY MOUTH EVERY NIGHT 90 tablet 3    losartan 50 MG Oral Tab Take 1 tablet (50 mg total) by mouth daily. 90 tablet 3    Empagliflozin (JARDIANCE) 25 MG Oral Tab Take 1 tablet by mouth daily. 30 tablet 11    metFORMIN HCl 1000 MG Oral Tab Take 1 tablet (1,000 mg total) by mouth 2 (two) times daily with meals. (Patient taking differently: Take 1 tablet (1,000 mg total) by mouth daily with breakfast.) 60 tablet 11    levothyroxine 75 MCG Oral Tab Take 1 tablet (75 mcg total) by mouth before breakfast. 90 tablet 3    pioglitazone 45 MG Oral Tab Take 1 tablet (45 mg total) by mouth daily. 90 tablet 3    Glucose Blood (ONETOUCH ULTRA) In Vitro Strip TEST TWICE DAILY AS DIRECTED 200 strip 3    Multiple Vitamins-Minerals (WOMENS ONE DAILY) Oral Tab Take by mouth.      Dulaglutide (TRULICITY) 1.5 MG/0.5ML Subcutaneous Solution Pen-injector ADMINISTER 1.5 MG UNDER THE SKIN 1 TIME A WEEK 2 mL 5   [2]   Past Medical History:   Benign essential HTN    Diabetes (HCC)    High blood pressure    Hypothyroidism   [3]   Social History  Socioeconomic History    Marital status:    Tobacco Use    Smoking status: Never     Passive exposure: Past    Smokeless tobacco: Never   Vaping Use    Vaping status: Never Used   Substance and Sexual Activity    Alcohol use: Never    Drug use: Never    Sexual activity: Not Currently     Partners: Male   Other Topics Concern    Blood Transfusions No

## 2025-04-23 RX ORDER — BLOOD SUGAR DIAGNOSTIC
STRIP MISCELLANEOUS
Qty: 200 STRIP | Refills: 3 | Status: SHIPPED | OUTPATIENT
Start: 2025-04-23

## 2025-04-23 RX ORDER — PIOGLITAZONE 45 MG/1
45 TABLET ORAL DAILY
Qty: 90 TABLET | Refills: 3 | Status: SHIPPED | OUTPATIENT
Start: 2025-04-23

## 2025-04-23 NOTE — TELEPHONE ENCOUNTER
Refill request is for a maintenance medication and has met the criteria specified in the Ambulatory Medication Refill Standing Order for eligibility, visits, laboratory, alerts and was sent to the requested pharmacy.    Requested Prescriptions     Signed Prescriptions Disp Refills    PIOGLITAZONE 45 MG Oral Tab 90 tablet 3     Sig: TAKE 1 TABLET(45 MG) BY MOUTH DAILY     Authorizing Provider: MICHAEL PALACIO     Ordering User: DALE AKERS

## 2025-04-23 NOTE — TELEPHONE ENCOUNTER
Refill request is for a maintenance medication and has met the criteria specified in the Ambulatory Medication Refill Standing Order for eligibility, visits, laboratory, alerts and was sent to the requested pharmacy.    Requested Prescriptions     Signed Prescriptions Disp Refills    Glucose Blood (ONETOUCH ULTRA) In Vitro Strip 200 strip 3     Sig: Test Twice daily as directed     Authorizing Provider: MICHAEL PALACIO     Ordering User: DALE AKERS

## 2025-04-24 NOTE — TELEPHONE ENCOUNTER
[facility] faxed over a Prior Authorization for    One Touch Ultra Blue Test Strips     Plan does not cover this medication.    Phone [phone]    ID [id number]    Fax placed in the purple folder

## 2025-05-01 DIAGNOSIS — M25.562 ACUTE PAIN OF LEFT KNEE: Primary | ICD-10-CM

## 2025-05-06 ENCOUNTER — TELEPHONE (OUTPATIENT)
Dept: INTERNAL MEDICINE CLINIC | Facility: CLINIC | Age: 65
End: 2025-05-06

## 2025-05-06 NOTE — TELEPHONE ENCOUNTER
Received outside hospital records,/3/2025, Dr. Bella.  Tenderness of medial joint line, knee injury back in March 2025, concern for medial meniscus tear of left knee.  Planning for MRI.

## 2025-07-22 NOTE — TELEPHONE ENCOUNTER
Received faxed refill request for below Medication from [id number]  Losartan 50mg Tablets  Qty #90  Take 1 tablet (50mg) by mouth daily    Patient has appointment on 9/30/25 with Dr. John Tristan to establish care due to Dr. Alvarado's practice closing

## 2025-07-24 DIAGNOSIS — E03.9 HYPOTHYROIDISM, UNSPECIFIED TYPE: ICD-10-CM

## 2025-07-24 RX ORDER — LOSARTAN POTASSIUM 50 MG/1
50 TABLET ORAL DAILY
Qty: 90 TABLET | Refills: 0 | Status: SHIPPED | OUTPATIENT
Start: 2025-07-24

## 2025-07-24 NOTE — TELEPHONE ENCOUNTER
[facility] faxed refill request for the following medications:    Levothyroxine 0.075 mg (75 mcg) tabs    Take 1 tablet (75 mcg) by mouth before breakfast.   Qty 90   Last filled 5/4/25.    Metformin 1000 mg tablets    Take 1 tablet (1000mg) by mouth twice daily with meals     Qty 60   Last filled 5/4/25.    Pt. Has an upcoming appt. With Dr. Sravanthi Tristan.

## 2025-07-25 DIAGNOSIS — E03.9 HYPOTHYROIDISM, UNSPECIFIED TYPE: ICD-10-CM

## 2025-07-25 RX ORDER — LEVOTHYROXINE SODIUM 75 UG/1
75 TABLET ORAL
Qty: 90 TABLET | Refills: 3 | OUTPATIENT
Start: 2025-07-25

## 2025-07-25 RX ORDER — LEVOTHYROXINE SODIUM 75 UG/1
75 TABLET ORAL
Qty: 90 TABLET | Refills: 3 | Status: SHIPPED | OUTPATIENT
Start: 2025-07-25 | End: 2025-07-25

## 2025-07-25 RX ORDER — LEVOTHYROXINE SODIUM 75 UG/1
75 TABLET ORAL
Qty: 90 TABLET | Refills: 0 | Status: SHIPPED | OUTPATIENT
Start: 2025-07-25

## 2025-07-25 NOTE — TELEPHONE ENCOUNTER
Please review. Refill failed protocol.     Sending to covering provider as Dr. Alvarado is no longer with practice.

## 2025-07-26 NOTE — TELEPHONE ENCOUNTER
Unable to send refill as pt has never established in my practice. Will only refill once established. This needs to be sent to the physician covering Dr. Alvarado in-basket.

## 2025-07-26 NOTE — TELEPHONE ENCOUNTER
Please review.  Protocol failed / Has no protocol.    Viv Arvizu MD  [facility] Refills1 hour ago (8:31 PM)     Unable to send refill as pt has never established in my practice. Will only refill once established. This needs to be sent to the physician covering Dr. Alvarado in-basket.           Requested Prescriptions   Pending Prescriptions Disp Refills    levothyroxine 75 MCG Oral Tab 90 tablet 0     Sig: Take 1 tablet (75 mcg total) by mouth before breakfast.       Thyroid Medication Protocol PASSED - 7/25/2025  9:44 PM       metFORMIN HCl 1000 MG Oral Tab 180 tablet 0     Sig: Take 1 tablet (1,000 mg total) by mouth 2 (two) times daily with meals.       Diabetes Medication Protocol Failed - 7/25/2025  9:44 PM        Failed - Last A1C < 7.5 and within past 6 months     Lab Results   Component Value Date    A1C 8.0 (H) 01/17/2025           Passed - In person appointment or virtual visit in the past 6 mos or appointment in next 3 mos        Passed - Microalbumin procedure in past 12 months or taking ACE/ARB        Passed - EGFRCR or GFRNAA > 50        Passed - GFR in the past 12 months        Passed - Medication is active on med list

## 2025-08-24 RX ORDER — DULAGLUTIDE 1.5 MG/.5ML
1.5 INJECTION, SOLUTION SUBCUTANEOUS WEEKLY
Qty: 2 ML | Refills: 2 | OUTPATIENT
Start: 2025-08-24

## 2025-08-25 RX ORDER — DULAGLUTIDE 1.5 MG/.5ML
1.5 INJECTION, SOLUTION SUBCUTANEOUS WEEKLY
Qty: 2 ML | Refills: 1 | Status: SHIPPED | OUTPATIENT
Start: 2025-08-25

## (undated) DIAGNOSIS — M79.605 LEFT LEG PAIN: Primary | ICD-10-CM

## (undated) DIAGNOSIS — E03.9 HYPOTHYROIDISM, UNSPECIFIED TYPE: Primary | ICD-10-CM

## (undated) DEVICE — 35 ML SYRINGE REGULAR TIP: Brand: MONOJECT

## (undated) DEVICE — SNARE OPTMZ PLPCTM TRP

## (undated) DEVICE — Device: Brand: DEFENDO AIR/WATER/SUCTION AND BIOPSY VALVE

## (undated) DEVICE — SNARE CAPTIFLEX MICRO-OVL OLY

## (undated) DEVICE — Device: Brand: CUSTOM PROCEDURE KIT

## (undated) DEVICE — MEDI-VAC NON-CONDUCTIVE SUCTION TUBING 6MM X 1.8M (6FT.) L: Brand: CARDINAL HEALTH

## (undated) DEVICE — LINE MNTR ADLT SET O2 INTMD

## (undated) NOTE — LETTER
June 25, 2021         Romana Mclaughlin, [address] [facility]      Patient: Mary Rosales   YOB: 1960   Date of Visit: 6/25/2021       Dear Dr. Kritsin Soares MD,    I saw your patient, Mary Rosales, on 9/73/9921.  Enclose

## (undated) NOTE — Clinical Note
Thank you for the consult. I saw Ms. Loi Sosa in  the endocrine/diabetes clinic today. Please see attached my note. Please feel free to contact me with any questions. Thanks!

## (undated) NOTE — LETTER
Mehran Mcneil Md  [address] [facility]  [place],  [place]       09/24/21        Patient: Juan A Medina   YOB: 1960   Date of Visit: 9/24/2021       Dear  Dr. Luis Antonio Chery MD,      Thank you for referring Juan A Medina to my practice.   P